# Patient Record
Sex: FEMALE | Race: WHITE | Employment: FULL TIME | URBAN - NONMETROPOLITAN AREA
[De-identification: names, ages, dates, MRNs, and addresses within clinical notes are randomized per-mention and may not be internally consistent; named-entity substitution may affect disease eponyms.]

---

## 2017-06-15 ENCOUNTER — OFFICE VISIT (OUTPATIENT)
Dept: PRIMARY CARE CLINIC | Age: 42
End: 2017-06-15
Payer: COMMERCIAL

## 2017-06-15 VITALS
HEART RATE: 74 BPM | DIASTOLIC BLOOD PRESSURE: 72 MMHG | HEIGHT: 63 IN | OXYGEN SATURATION: 98 % | BODY MASS INDEX: 24.98 KG/M2 | WEIGHT: 141 LBS | SYSTOLIC BLOOD PRESSURE: 112 MMHG | TEMPERATURE: 98.3 F

## 2017-06-15 DIAGNOSIS — L24.7 IRRITANT CONTACT DERMATITIS DUE TO PLANTS, EXCEPT FOOD: Primary | ICD-10-CM

## 2017-06-15 PROCEDURE — 99213 OFFICE O/P EST LOW 20 MIN: CPT | Performed by: FAMILY MEDICINE

## 2017-06-15 RX ORDER — TRIAMCINOLONE ACETONIDE 1 MG/G
CREAM TOPICAL 3 TIMES DAILY
Qty: 80 G | Refills: 1 | Status: SHIPPED | OUTPATIENT
Start: 2017-06-15 | End: 2021-11-10

## 2017-06-15 ASSESSMENT — ENCOUNTER SYMPTOMS
EYES NEGATIVE: 1
ALLERGIC/IMMUNOLOGIC NEGATIVE: 1
RESPIRATORY NEGATIVE: 1
GASTROINTESTINAL NEGATIVE: 1

## 2019-11-29 ENCOUNTER — HOSPITAL ENCOUNTER (EMERGENCY)
Age: 44
Discharge: HOME | End: 2019-11-29
Attending: EMERGENCY MEDICINE
Payer: COMMERCIAL

## 2019-11-29 VITALS
OXYGEN SATURATION: 97 % | HEART RATE: 93 BPM | RESPIRATION RATE: 16 BRPM | BODY MASS INDEX: 24.45 KG/M2 | WEIGHT: 138 LBS | TEMPERATURE: 99.7 F | DIASTOLIC BLOOD PRESSURE: 54 MMHG | SYSTOLIC BLOOD PRESSURE: 94 MMHG

## 2019-11-29 DIAGNOSIS — R11.2 NAUSEA VOMITING AND DIARRHEA: Primary | ICD-10-CM

## 2019-11-29 DIAGNOSIS — N39.0 URINARY TRACT INFECTION WITHOUT HEMATURIA, SITE UNSPECIFIED: ICD-10-CM

## 2019-11-29 DIAGNOSIS — R19.7 NAUSEA VOMITING AND DIARRHEA: Primary | ICD-10-CM

## 2019-11-29 DIAGNOSIS — E87.6 HYPOKALEMIA: ICD-10-CM

## 2019-11-29 DIAGNOSIS — E86.0 DEHYDRATION: ICD-10-CM

## 2019-11-29 LAB
-: ABNORMAL
ABSOLUTE EOS #: 0 K/UL (ref 0–0.4)
ABSOLUTE IMMATURE GRANULOCYTE: ABNORMAL K/UL (ref 0–0.3)
ABSOLUTE LYMPH #: 0.32 K/UL (ref 1–4.8)
ABSOLUTE MONO #: 0.24 K/UL (ref 0.1–1.2)
AMORPHOUS: ABNORMAL
ANION GAP SERPL CALCULATED.3IONS-SCNC: 14 MMOL/L (ref 9–17)
BACTERIA: ABNORMAL
BASOPHILS # BLD: 1 % (ref 0–1)
BASOPHILS ABSOLUTE: 0.08 K/UL (ref 0–0.2)
BILIRUBIN URINE: NEGATIVE
BUN BLDV-MCNC: 13 MG/DL (ref 6–20)
BUN/CREAT BLD: 24 (ref 9–20)
CALCIUM SERPL-MCNC: 9.1 MG/DL (ref 8.6–10.4)
CASTS UA: ABNORMAL /LPF (ref 0–2)
CHLORIDE BLD-SCNC: 102 MMOL/L (ref 98–107)
CO2: 22 MMOL/L (ref 20–31)
COLOR: ABNORMAL
COMMENT UA: ABNORMAL
CREAT SERPL-MCNC: 0.54 MG/DL (ref 0.5–0.9)
CRYSTALS, UA: ABNORMAL /HPF
DIFFERENTIAL TYPE: ABNORMAL
EOSINOPHILS RELATIVE PERCENT: 0 % (ref 1–7)
EPITHELIAL CELLS UA: ABNORMAL /HPF (ref 0–5)
GFR AFRICAN AMERICAN: >60 ML/MIN
GFR NON-AFRICAN AMERICAN: >60 ML/MIN
GFR SERPL CREATININE-BSD FRML MDRD: ABNORMAL ML/MIN/{1.73_M2}
GFR SERPL CREATININE-BSD FRML MDRD: ABNORMAL ML/MIN/{1.73_M2}
GLUCOSE BLD-MCNC: 119 MG/DL (ref 70–99)
GLUCOSE URINE: NEGATIVE
HCT VFR BLD CALC: 41.7 % (ref 36–46)
HEMOGLOBIN: 14 G/DL (ref 12–16)
IMMATURE GRANULOCYTES: ABNORMAL %
KETONES, URINE: ABNORMAL
LEUKOCYTE ESTERASE, URINE: ABNORMAL
LYMPHOCYTES # BLD: 4 % (ref 16–46)
MCH RBC QN AUTO: 30.3 PG (ref 26–34)
MCHC RBC AUTO-ENTMCNC: 33.6 G/DL (ref 31–37)
MCV RBC AUTO: 90 FL (ref 80–100)
MONOCYTES # BLD: 3 % (ref 4–11)
MORPHOLOGY: ABNORMAL
MORPHOLOGY: ABNORMAL
MUCUS: ABNORMAL
NITRITE, URINE: NEGATIVE
NRBC AUTOMATED: ABNORMAL PER 100 WBC
OTHER OBSERVATIONS UA: ABNORMAL
PDW BLD-RTO: 13.9 % (ref 11–14.5)
PH UA: 5.5 (ref 5–6)
PLATELET # BLD: 205 K/UL (ref 140–450)
PLATELET ESTIMATE: ABNORMAL
PMV BLD AUTO: 7.7 FL (ref 6–12)
POTASSIUM SERPL-SCNC: 3.6 MMOL/L (ref 3.7–5.3)
PROTEIN UA: NEGATIVE
RBC # BLD: 4.64 M/UL (ref 4–5.2)
RBC # BLD: ABNORMAL 10*6/UL
RBC UA: ABNORMAL /HPF (ref 0–4)
RENAL EPITHELIAL, UA: ABNORMAL /HPF
SEG NEUTROPHILS: 92 % (ref 43–77)
SEGMENTED NEUTROPHILS ABSOLUTE COUNT: 7.46 K/UL (ref 1.8–7.7)
SODIUM BLD-SCNC: 138 MMOL/L (ref 135–144)
SPECIFIC GRAVITY UA: 1.03 (ref 1.01–1.02)
TRICHOMONAS: ABNORMAL
TURBIDITY: ABNORMAL
URINE HGB: ABNORMAL
UROBILINOGEN, URINE: NORMAL
WBC # BLD: 8.1 K/UL (ref 3.5–11)
WBC # BLD: ABNORMAL 10*3/UL
WBC UA: ABNORMAL /HPF (ref 0–4)
YEAST: ABNORMAL

## 2019-11-29 PROCEDURE — 85025 COMPLETE CBC W/AUTO DIFF WBC: CPT

## 2019-11-29 PROCEDURE — 81001 URINALYSIS AUTO W/SCOPE: CPT

## 2019-11-29 PROCEDURE — 6360000002 HC RX W HCPCS: Performed by: EMERGENCY MEDICINE

## 2019-11-29 PROCEDURE — 80048 BASIC METABOLIC PNL TOTAL CA: CPT

## 2019-11-29 PROCEDURE — 96374 THER/PROPH/DIAG INJ IV PUSH: CPT

## 2019-11-29 PROCEDURE — 96361 HYDRATE IV INFUSION ADD-ON: CPT

## 2019-11-29 PROCEDURE — 2580000003 HC RX 258: Performed by: EMERGENCY MEDICINE

## 2019-11-29 PROCEDURE — 6370000000 HC RX 637 (ALT 250 FOR IP): Performed by: SPECIALIST

## 2019-11-29 PROCEDURE — 99284 EMERGENCY DEPT VISIT MOD MDM: CPT

## 2019-11-29 RX ORDER — 0.9 % SODIUM CHLORIDE 0.9 %
1000 INTRAVENOUS SOLUTION INTRAVENOUS ONCE
Status: COMPLETED | OUTPATIENT
Start: 2019-11-29 | End: 2019-11-29

## 2019-11-29 RX ORDER — SULFAMETHOXAZOLE AND TRIMETHOPRIM 800; 160 MG/1; MG/1
1 TABLET ORAL 2 TIMES DAILY
Qty: 14 TABLET | Refills: 0 | Status: SHIPPED | OUTPATIENT
Start: 2019-11-29 | End: 2019-12-06

## 2019-11-29 RX ORDER — ONDANSETRON 2 MG/ML
4 INJECTION INTRAMUSCULAR; INTRAVENOUS ONCE
Status: COMPLETED | OUTPATIENT
Start: 2019-11-29 | End: 2019-11-29

## 2019-11-29 RX ORDER — ONDANSETRON 4 MG/1
4 TABLET, ORALLY DISINTEGRATING ORAL EVERY 8 HOURS PRN
Qty: 12 TABLET | Refills: 0 | Status: ON HOLD | OUTPATIENT
Start: 2019-11-29 | End: 2021-11-11 | Stop reason: HOSPADM

## 2019-11-29 RX ORDER — SULFAMETHOXAZOLE AND TRIMETHOPRIM 800; 160 MG/1; MG/1
1 TABLET ORAL ONCE
Status: COMPLETED | OUTPATIENT
Start: 2019-11-29 | End: 2019-11-29

## 2019-11-29 RX ORDER — POTASSIUM CHLORIDE 20 MEQ/1
20 TABLET, EXTENDED RELEASE ORAL ONCE
Status: COMPLETED | OUTPATIENT
Start: 2019-11-29 | End: 2019-11-29

## 2019-11-29 RX ADMIN — SULFAMETHOXAZOLE AND TRIMETHOPRIM 1 TABLET: 800; 160 TABLET ORAL at 20:07

## 2019-11-29 RX ADMIN — SODIUM CHLORIDE 1000 ML: 9 INJECTION, SOLUTION INTRAVENOUS at 19:24

## 2019-11-29 RX ADMIN — POTASSIUM CHLORIDE 20 MEQ: 20 TABLET, EXTENDED RELEASE ORAL at 20:07

## 2019-11-29 RX ADMIN — ONDANSETRON 4 MG: 2 INJECTION INTRAMUSCULAR; INTRAVENOUS at 19:24

## 2019-11-29 ASSESSMENT — PAIN SCALES - GENERAL: PAINLEVEL_OUTOF10: 6

## 2019-11-29 ASSESSMENT — PAIN DESCRIPTION - LOCATION: LOCATION: ABDOMEN

## 2021-02-22 ENCOUNTER — HOSPITAL ENCOUNTER (OUTPATIENT)
Dept: GENERAL RADIOLOGY | Age: 46
Discharge: HOME | End: 2021-02-24
Payer: COMMERCIAL

## 2021-02-22 ENCOUNTER — OFFICE VISIT (OUTPATIENT)
Dept: PRIMARY CARE CLINIC | Age: 46
End: 2021-02-22
Payer: COMMERCIAL

## 2021-02-22 VITALS
SYSTOLIC BLOOD PRESSURE: 120 MMHG | HEART RATE: 88 BPM | WEIGHT: 167 LBS | BODY MASS INDEX: 30.73 KG/M2 | TEMPERATURE: 97.9 F | HEIGHT: 62 IN | DIASTOLIC BLOOD PRESSURE: 88 MMHG | OXYGEN SATURATION: 99 %

## 2021-02-22 DIAGNOSIS — R10.9 LEFT LATERAL ABDOMINAL PAIN: ICD-10-CM

## 2021-02-22 DIAGNOSIS — R10.9 LEFT LATERAL ABDOMINAL PAIN: Primary | ICD-10-CM

## 2021-02-22 PROCEDURE — 74018 RADEX ABDOMEN 1 VIEW: CPT

## 2021-02-22 PROCEDURE — 99214 OFFICE O/P EST MOD 30 MIN: CPT | Performed by: FAMILY MEDICINE

## 2021-02-22 ASSESSMENT — ENCOUNTER SYMPTOMS
VOMITING: 0
RESPIRATORY NEGATIVE: 1
CONSTIPATION: 0
EYES NEGATIVE: 1
ABDOMINAL PAIN: 1
NAUSEA: 1
ALLERGIC/IMMUNOLOGIC NEGATIVE: 1
BACK PAIN: 1
DIARRHEA: 0

## 2021-02-22 NOTE — PROGRESS NOTES
Subjective:      Patient ID: Vel Barron is a 54 y.o. female. HPI Acute urgent care visit for chronic discomfort in the left side of the abdomen in the last 2 months. Colicy, off and on by report. Eating too much can make it worse. Mild nausea recalled for a couple day then resolved. Lower left back pain , new, starting Saturday. More constant. No pain to palpation over the lower back. Hurst more stretching the back or bending over, described as an ache. No new medications or changes in existing medications. Some wt. Gain, no changes in appetite. No fever or chills. No perceived bowel issues, no constipation. Some urinary urgency described. Past Medical History:   Diagnosis Date    Goiter     Hypothyroidism      Past Surgical History:   Procedure Laterality Date     SECTION  2005    COLONOSCOPY  2007    with distal ileoscopy and biopsy of the distal ileum and biopsy of the rectum; nonspecific proctitis, otherwise normal colon and normal distal ileum     Current Outpatient Medications   Medication Sig Dispense Refill    ondansetron (ZOFRAN ODT) 4 MG disintegrating tablet Take 1 tablet by mouth every 8 hours as needed for Nausea (Patient not taking: Reported on 2021) 12 tablet 0    triamcinolone (KENALOG) 0.1 % cream Apply topically 3 times daily (Patient not taking: Reported on 2021) 80 g 1    levothyroxine (SYNTHROID) 112 MCG tablet Take 1 tablet by mouth Daily. (Patient not taking: Reported on 2021) 30 tablet 2     No current facility-administered medications for this visit. Allergies   Allergen Reactions    Codeine     Other      decongestants    Penicillins          Review of Systems   Constitutional: Negative. Negative for appetite change, fever and unexpected weight change. HENT: Negative. Eyes: Negative. Respiratory: Negative. Cardiovascular: Negative. Gastrointestinal: Positive for abdominal pain (left side) and nausea. Negative for constipation, diarrhea and vomiting. Endocrine: Negative. Genitourinary: Positive for urgency. Negative for dysuria, frequency and menstrual problem (regular, described as heavy). Musculoskeletal: Positive for back pain (lower back, left of midline. ). Skin: Negative. Allergic/Immunologic: Negative. Neurological: Negative. Hematological: Negative. Psychiatric/Behavioral: Negative. Objective:   Physical Exam  Constitutional:       General: She is not in acute distress. Appearance: She is well-developed. HENT:      Head: Normocephalic and atraumatic. Right Ear: External ear normal.      Left Ear: External ear normal.      Mouth/Throat:      Pharynx: No oropharyngeal exudate. Eyes:      General: No scleral icterus. Conjunctiva/sclera: Conjunctivae normal.   Neck:      Musculoskeletal: Neck supple. Thyroid: No thyromegaly. Cardiovascular:      Rate and Rhythm: Normal rate and regular rhythm. Heart sounds: Normal heart sounds. No murmur. Pulmonary:      Effort: Pulmonary effort is normal. No respiratory distress. Breath sounds: Normal breath sounds. No wheezing. Abdominal:      General: Bowel sounds are normal. There is no distension. Palpations: Abdomen is soft. Tenderness: There is no abdominal tenderness. There is no rebound. Musculoskeletal: Normal range of motion. General: No tenderness. Skin:     General: Skin is warm and dry. Findings: No erythema or rash. Neurological:      Mental Status: She is alert and oriented to person, place, and time. Psychiatric:         Behavior: Behavior normal.         Thought Content:  Thought content normal.         Judgment: Judgment normal. /88 (Site: Left Upper Arm, Position: Sitting, Cuff Size: Medium Adult)   Pulse 88   Temp 97.9 °F (36.6 °C) (Temporal)   Ht 5' 2\" (1.575 m)   Wt 167 lb (75.8 kg)   SpO2 99%   BMI 30.54 kg/m²     Assessment:      Encounter Diagnosis   Name Primary?  Left lateral abdominal pain Yes           Plan:      Some burden of stool in the right upper quadrant. Gas bubble in left abdomen, likely colon. Discussed miralax, fiber, MOM trials in the next few days to rule out suspected constipation issues. Discussed developing a daily regimen against constipation. She is to establish here as a patient for follow up. Return for worsening, persistent , or new symptoms of concern.                Jeanna Paiz MD

## 2021-05-05 ENCOUNTER — TELEPHONE (OUTPATIENT)
Dept: SURGERY | Age: 46
End: 2021-05-05

## 2021-05-05 NOTE — TELEPHONE ENCOUNTER
Patient due for screening colonoscopy, no symptoms. Referral from Dr. Marcos Jiménez. She is requesting procedure at [facility]. Dr. Catalina Maria colonoscopy packet mailed.

## 2021-10-26 ENCOUNTER — HOSPITAL ENCOUNTER (OUTPATIENT)
Age: 46
Setting detail: SPECIMEN
Discharge: HOME | End: 2021-10-26
Payer: COMMERCIAL

## 2021-10-26 ENCOUNTER — OFFICE VISIT (OUTPATIENT)
Dept: PRIMARY CARE CLINIC | Age: 46
End: 2021-10-26
Payer: COMMERCIAL

## 2021-10-26 VITALS
RESPIRATION RATE: 20 BRPM | WEIGHT: 166.8 LBS | HEART RATE: 110 BPM | DIASTOLIC BLOOD PRESSURE: 78 MMHG | OXYGEN SATURATION: 98 % | TEMPERATURE: 98.5 F | BODY MASS INDEX: 30.51 KG/M2 | SYSTOLIC BLOOD PRESSURE: 112 MMHG

## 2021-10-26 DIAGNOSIS — R52 BODY ACHES: ICD-10-CM

## 2021-10-26 DIAGNOSIS — J02.9 PHARYNGITIS, UNSPECIFIED ETIOLOGY: Primary | ICD-10-CM

## 2021-10-26 DIAGNOSIS — J02.9 PHARYNGITIS, UNSPECIFIED ETIOLOGY: ICD-10-CM

## 2021-10-26 DIAGNOSIS — Z20.822 CLOSE EXPOSURE TO COVID-19 VIRUS: ICD-10-CM

## 2021-10-26 DIAGNOSIS — Z20.822 PERSON UNDER INVESTIGATION FOR COVID-19: ICD-10-CM

## 2021-10-26 PROCEDURE — U0005 INFEC AGEN DETEC AMPLI PROBE: HCPCS

## 2021-10-26 PROCEDURE — U0003 INFECTIOUS AGENT DETECTION BY NUCLEIC ACID (DNA OR RNA); SEVERE ACUTE RESPIRATORY SYNDROME CORONAVIRUS 2 (SARS-COV-2) (CORONAVIRUS DISEASE [COVID-19]), AMPLIFIED PROBE TECHNIQUE, MAKING USE OF HIGH THROUGHPUT TECHNOLOGIES AS DESCRIBED BY CMS-2020-01-R: HCPCS

## 2021-10-26 PROCEDURE — 99213 OFFICE O/P EST LOW 20 MIN: CPT | Performed by: NURSE PRACTITIONER

## 2021-10-26 ASSESSMENT — ENCOUNTER SYMPTOMS
VOMITING: 0
SORE THROAT: 1
NAUSEA: 0
RHINORRHEA: 0
COUGH: 0
ABDOMINAL PAIN: 0
RESPIRATORY NEGATIVE: 1

## 2021-10-26 ASSESSMENT — PATIENT HEALTH QUESTIONNAIRE - PHQ9
SUM OF ALL RESPONSES TO PHQ QUESTIONS 1-9: 0
1. LITTLE INTEREST OR PLEASURE IN DOING THINGS: 0
SUM OF ALL RESPONSES TO PHQ9 QUESTIONS 1 & 2: 0
2. FEELING DOWN, DEPRESSED OR HOPELESS: 0

## 2021-10-26 NOTE — PROGRESS NOTES
[facility]             [address], [address]                        Telephone [phone]             Fax [phone]     Satnam Arambula  1975  ROS:[id number]   Date of visit:  10/26/2021    Subjective:    Satnam Arambula is a 54 y.o.  female who presents to [facility] today (10/26/2021) for evaluation of:    Chief Complaint   Patient presents with    Pharyngitis      was covid positive        Pharyngitis  This is a new problem. The current episode started in the past 7 days (10/24/21). The problem occurs constantly. The problem has been unchanged. Associated symptoms include myalgias (now resolved) and a sore throat (scratchy). Pertinent negatives include no abdominal pain, chest pain, chills, congestion, coughing, fatigue, fever, headaches, nausea, rash or vomiting. Nothing aggravates the symptoms. Treatments tried: ibuprofen, zinc, vitamin C. The treatment provided moderate relief. She has not received Covid-19 vaccine. She has the following problem list:  There is no problem list on file for this patient. Current medications are:  Current Outpatient Medications   Medication Sig Dispense Refill    Thyroid 48.75 MG TABS Take 48.75 mg by mouth daily      ondansetron (ZOFRAN ODT) 4 MG disintegrating tablet Take 1 tablet by mouth every 8 hours as needed for Nausea (Patient not taking: Reported on 2/22/2021) 12 tablet 0    triamcinolone (KENALOG) 0.1 % cream Apply topically 3 times daily (Patient not taking: Reported on 2/22/2021) 80 g 1    levothyroxine (SYNTHROID) 112 MCG tablet Take 1 tablet by mouth Daily. (Patient not taking: Reported on 2/22/2021) 30 tablet 2     No current facility-administered medications for this visit. She is allergic to codeine, other, and penicillins. .    She  reports that she has quit smoking.  She has never used smokeless tobacco.      Objective:    Vitals:    10/26/21 1537   BP: 112/78   Pulse: 110   Resp: 20   Temp: 98.5 °F (36.9 °C)   SpO2: 98%   Weight: 166 lb 12.8 oz (75.7 kg)     Body mass index is 30.51 kg/m². Review of Systems   Constitutional: Negative. Negative for appetite change, chills, fatigue and fever. HENT: Positive for sore throat (scratchy). Negative for congestion, postnasal drip and rhinorrhea. Respiratory: Negative. Negative for cough. Cardiovascular: Negative. Negative for chest pain. Gastrointestinal: Negative for abdominal pain, nausea and vomiting. Musculoskeletal: Positive for myalgias (now resolved). Skin: Negative for rash. Neurological: Negative for headaches. Physical Exam  Vitals and nursing note reviewed. Constitutional:       Appearance: She is well-developed. HENT:      Head: Normocephalic. Jaw: There is normal jaw occlusion. Right Ear: Tympanic membrane, ear canal and external ear normal.      Left Ear: Tympanic membrane, ear canal and external ear normal.      Nose: Nose normal.      Mouth/Throat:      Lips: Pink. Mouth: Mucous membranes are moist.      Pharynx: Uvula midline. Posterior oropharyngeal erythema present. Tonsils: 1+ on the right. 1+ on the left. Eyes:      Pupils: Pupils are equal, round, and reactive to light. Cardiovascular:      Rate and Rhythm: Normal rate and regular rhythm. Heart sounds: Normal heart sounds. Pulmonary:      Effort: Pulmonary effort is normal.      Breath sounds: Normal breath sounds and air entry. Musculoskeletal:      Cervical back: Normal range of motion and neck supple. Lymphadenopathy:      Cervical: No cervical adenopathy. Skin:     General: Skin is warm and dry. Neurological:      General: No focal deficit present. Mental Status: She is alert and oriented to person, place, and time. Psychiatric:         Behavior: Behavior normal.         Thought Content:  Thought content normal. Assessment and Plan:    No results found for this visit on 10/26/21. Diagnosis Orders   1. Pharyngitis, unspecified etiology  COVID-19   2. Body aches  COVID-19   3. Close exposure to COVID-19 virus  COVID-19   4. Person under investigation for COVID-19  COVID-19     Self quarantine until negative Covid-19 test result received and symptoms improving. We will call with Covid-19 test results. We discussed Regeneron infusion and printed information was provided. I recommended alternating tylenol and ibuprofen for pain, increase fluid intake, and eating popsicles and jello for comfort. Warm salt water gargles. Use Chloraseptic spray as needed for sore throat. Follow up with PCP if symptoms not improved or worsen. The use, risks, benefits, and side effects of prescribed or recommended medications were discussed. All questions were answered and the patient/caregiver voiced understanding. No orders of the defined types were placed in this encounter.         Electronically signed by JAMES Ovalles CNP on 10/26/21 at 3:47 PM EDT

## 2021-10-26 NOTE — PATIENT INSTRUCTIONS
Patient Education        Learning About Coronavirus [phone])  What is coronavirus (COVID-19)? COVID-19 is a disease caused by a type of coronavirus. This illness was first found in December 2019. It has since spread worldwide. Coronaviruses are a large group of viruses. They cause the common cold. They also cause more serious illnesses like [place] respiratory syndrome (MERS) and severe acute respiratory syndrome (SARS). COVID-19 is caused by a novel coronavirus. That means it's a new type that has not been seen in people before. What are the symptoms? COVID-19 symptoms may include:  · Fever. · Cough. · Trouble breathing. · Chills or repeated shaking with chills. · Muscle and body aches. · Headache. · Sore throat. · New loss of taste or smell. · Vomiting. · Diarrhea. In severe cases, COVID-19 can cause pneumonia and make it hard to breathe without help from a machine. It can cause death. How is it diagnosed? COVID-19 is diagnosed with a viral test. This may also be called a PCR test or antigen test. It looks for evidence of the virus in your breathing passages or lungs (respiratory system). The test is most often done on a sample from the nose, throat, or lungs. It's sometimes done on a sample of saliva. One way a sample is collected is by putting a long swab into the back of your nose. How is it treated? Mild cases of COVID-19 can be treated at home. Serious cases need treatment in the hospital. Treatment may include medicines to reduce symptoms, plus breathing support such as oxygen therapy or a ventilator. Some people may be placed on their belly to help their oxygen levels. Treatments that may help people who have COVID-19 include:  Antiviral medicines. These medicines treat viral infections. Remdesivir is an example. Immune-based therapy. These medicines help the immune system fight COVID-19. Examples include monoclonal antibodies. Blood thinners.    These medicines help prevent blood clots. People with severe illness are at risk for blood clots. How can you protect yourself and others? The best way to protect yourself from getting sick is to:  · Get vaccinated. · Avoid sick people. · If you are not fully vaccinated:  ? Wear a mask if you have to go to public areas. ? Avoid crowds and try to stay at least 6 feet away from other people. · Cover your mouth with a tissue when you cough or sneeze. · Wash your hands often, especially after you cough or sneeze. Use soap and water, and scrub for at least 20 seconds. If soap and water aren't available, use an alcohol-based hand . · Avoid touching your mouth, nose, and eyes. To help avoid spreading the virus to others:  · Get vaccinated. · Cover your mouth with a tissue when you cough or sneeze. · Wash your hands often, especially after you cough or sneeze. Use soap and water, and scrub for at least 20 seconds. If soap and water aren't available, use an alcohol-based hand . · If you have been exposed to the virus and are not fully vaccinated:  ? Stay home. Don't go to school, work, or public areas. And don't use public transportation, ride-shares, or taxis unless you have no choice. ? Wear a mask if you have to go to public areas, like the pharmacy. · If you're sick:  ? Leave your home only if you need to get medical care. But call the doctor's office first so they know you're coming. And wear a mask. ? Wear a mask whenever you're around other people. ? Limit contact with pets and people in your home. If possible, stay in a separate bedroom and use a separate bathroom. ? Clean and disinfect your home every day. Use household  and disinfectant wipes or sprays. Take special care to clean things that you touch with your hands. How can you self-isolate when you have COVID-19? If you have COVID-19, there are things you can do to help avoid spreading the virus to others.   · Limit contact with people in your home. If possible, stay in a separate bedroom and use a separate bathroom. · Wear a mask when you are around other people. · If you have to leave home, avoid crowds and try to stay at least 6 feet away from other people. · Avoid contact with pets and other animals. · Cover your mouth and nose with a tissue when you cough or sneeze. Then throw it in the trash right away. · Wash your hands often, especially after you cough or sneeze. Use soap and water, and scrub for at least 20 seconds. If soap and water aren't available, use an alcohol-based hand . · Don't share personal household items. These include bedding, towels, cups and glasses, and eating utensils. · [address] in the warmest water allowed for the fabric type, and dry it completely. It's okay to wash other people's laundry with yours. · Clean and disinfect your home. Use household  and disinfectant wipes or sprays. When should you call for help? Call 911 anytime you think you may need emergency care. For example, call if you have life-threatening symptoms, such as:    · You have severe trouble breathing. (You can't talk at all.)     · You have constant chest pain or pressure.     · You are severely dizzy or lightheaded.     · You are confused or can't think clearly.     · You have pale, gray, or blue-colored skin or lips.     · You pass out (lose consciousness) or are very hard to wake up. Call your doctor now or seek immediate medical care if:    · You have moderate trouble breathing. (You can't speak a full sentence.)     · You are coughing up blood (more than about 1 teaspoon).     · You have signs of low blood pressure. These include feeling lightheaded; being too weak to stand; and having cold, pale, clammy skin.    Watch closely for changes in your health, and be sure to contact your doctor if:    · Your symptoms get worse.     · You are not getting better as expected.     · You have new or worse symptoms of anxiety, depression, nightmares, or flashbacks. Call before you go to the doctor's office. Follow their instructions. And wear a mask. Current as of: July 1, 2021               Content Version: 13.0  © 2006-2021 [employer], Incorporated. Care instructions adapted under license by [facility] ([facility]). If you have questions about a medical condition or this instruction, always ask your healthcare professional. [place] [address] any warranty or liability for your use of this information. Patient Education        Sore Throat: Care Instructions  Your Care Instructions     Infection by bacteria or a virus causes most sore throats. Cigarette smoke, dry air, air pollution, allergies, and yelling can also cause a sore throat. Sore throats can be painful and annoying. Fortunately, most sore throats go away on their own. If you have a bacterial infection, your doctor may prescribe antibiotics. Follow-up care is a key part of your treatment and safety. Be sure to make and go to all appointments, and call your doctor if you are having problems. It's also a good idea to know your test results and keep a list of the medicines you take. How can you care for yourself at home? · If your doctor prescribed antibiotics, take them as directed. Do not stop taking them just because you feel better. You need to take the full course of antibiotics. · Gargle with warm salt water once an hour to help reduce swelling and relieve discomfort. Use 1 teaspoon of salt mixed in 1 cup of warm water. · Take an over-the-counter pain medicine, such as acetaminophen (Tylenol), ibuprofen (Advil, Motrin), or naproxen (Aleve). Read and follow all instructions on the label. · Be careful when taking over-the-counter cold or flu medicines and Tylenol at the same time. Many of these medicines have acetaminophen, which is Tylenol. Read the labels to make sure that you are not taking more than the recommended dose.  Too much acetaminophen (Tylenol) can be harmful. · Drink plenty of fluids. Fluids may help soothe an irritated throat. Hot fluids, such as tea or soup, may help decrease throat pain. · Use over-the-counter throat lozenges to soothe pain. Regular cough drops or hard candy may also help. These should not be given to young children because of the risk of choking. · Do not smoke or allow others to smoke around you. If you need help quitting, talk to your doctor about stop-smoking programs and medicines. These can increase your chances of quitting for good. · Use a vaporizer or humidifier to add moisture to your bedroom. Follow the directions for cleaning the machine. When should you call for help? Call your doctor now or seek immediate medical care if:    · You have new or worse trouble swallowing.     · Your sore throat gets much worse on one side. Watch closely for changes in your health, and be sure to contact your doctor if you do not get better as expected. Where can you learn more? Go to https://StatsMix.[employer]. org and sign in to your [employer] account. Enter G401 in the [facility] box to learn more about \"Sore Throat: Care Instructions. \"     If you do not have an account, please click on the \"Sign Up Now\" link. Current as of: December 2, 2020               Content Version: 13.0  © 3929-7502 [employer]. Care instructions adapted under license by [facility] ([facility]). If you have questions about a medical condition or this instruction, always ask your healthcare professional. [place] [address] any warranty or liability for your use of this information.

## 2021-10-27 LAB
SARS-COV-2: ABNORMAL
SARS-COV-2: DETECTED
SOURCE: ABNORMAL

## 2021-11-01 ENCOUNTER — HOSPITAL ENCOUNTER (EMERGENCY)
Age: 46
Discharge: HOME | End: 2021-11-01
Attending: EMERGENCY MEDICINE
Payer: COMMERCIAL

## 2021-11-01 VITALS
RESPIRATION RATE: 18 BRPM | BODY MASS INDEX: 28.35 KG/M2 | DIASTOLIC BLOOD PRESSURE: 60 MMHG | TEMPERATURE: 100.1 F | OXYGEN SATURATION: 98 % | WEIGHT: 160 LBS | HEIGHT: 63 IN | SYSTOLIC BLOOD PRESSURE: 115 MMHG | HEART RATE: 108 BPM

## 2021-11-01 DIAGNOSIS — U07.1 COVID: Primary | ICD-10-CM

## 2021-11-01 LAB — GLUCOSE BLD-MCNC: 118 MG/DL (ref 65–105)

## 2021-11-01 PROCEDURE — 99284 EMERGENCY DEPT VISIT MOD MDM: CPT

## 2021-11-01 PROCEDURE — 6370000000 HC RX 637 (ALT 250 FOR IP): Performed by: EMERGENCY MEDICINE

## 2021-11-01 PROCEDURE — 82947 ASSAY GLUCOSE BLOOD QUANT: CPT

## 2021-11-01 RX ORDER — DIPHENHYDRAMINE HCL 25 MG
50 TABLET ORAL ONCE
Status: COMPLETED | OUTPATIENT
Start: 2021-11-01 | End: 2021-11-01

## 2021-11-01 RX ADMIN — DIPHENHYDRAMINE HYDROCHLORIDE 50 MG: 25 TABLET ORAL at 03:30

## 2021-11-01 NOTE — ED PROVIDER NOTES
eMERGENCY dEPARTMENT eNCOUnter      Pt Name: Juliocesar Priest  MRN: [id number]  [place] 1975  Date of evaluation: 2021      CHIEF COMPLAINT       Chief Complaint   Patient presents with    Positive For Covid-19     fatigue, not able to eat         HISTORY OF PRESENT ILLNESS    Juliocesar Priest is a 51 y.o. female who presents for fatigue decreased appetite. Patient states she was diagnosed with Covid over last couple days she states she has not had an appetite she has been feeling tired headache body aches no chest pain no significant shortness of breath mild fever patient states her anxiety has been acting up        REVIEW OF SYSTEMS       Review of systems are all reviewed and negative except stated above in HPI    [address]    has a past medical history of Goiter and Hypothyroidism. SURGICAL HISTORY      has a past surgical history that includes  section (2005) and Colonoscopy (2007). CURRENT MEDICATIONS       Discharge Medication List as of 2021  3:45 AM      CONTINUE these medications which have NOT CHANGED    Details   Thyroid 48.75 MG TABS Take 48.75 mg by mouth dailyHistorical Med      ondansetron (ZOFRAN ODT) 4 MG disintegrating tablet Take 1 tablet by mouth every 8 hours as needed for Nausea, Disp-12 tablet, R-0Print      triamcinolone (KENALOG) 0.1 % cream Apply topically 3 times daily, Topical, 3 TIMES DAILY Starting 6/15/2017, Until Discontinued, Disp-80 g, R-1, Normal      levothyroxine (SYNTHROID) 112 MCG tablet Take 1 tablet by mouth Daily. , Disp-30 tablet, R-2             ALLERGIES     is allergic to codeine, other, and penicillins. FAMILY HISTORY     She indicated that the status of her mother is unknown. She indicated that the status of her father is unknown.  She indicated that the status of her brother is unknown.     family history includes Colon Cancer in her mother; Emphysema in her father; High Blood Pressure in her brother and sister; Other in her mother, sister, and sister; Thyroid Disease in her mother. SOCIAL HISTORY      reports that she has quit smoking. She has never used smokeless tobacco. She reports that she does not drink alcohol and does not use drugs. PHYSICAL EXAM     INITIAL VITALS:  height is 5' 3\" (1.6 m) and weight is 160 lb (72.6 kg). Her tympanic temperature is 100.1 °F (37.8 °C). Her blood pressure is 115/60 and her pulse is 108. Her respiration is 18 and oxygen saturation is 98%.       General: Patient alert nontoxic-appearing female in no apparent distress  HEENT: Head is atraumatic  Respiratory: Lung sounds are clear bilateral  Cardiac: Heart is mildly tachycardic without murmur  GI: Abdomen soft nontender  Skin: Warm dry no rash  Neuro: Patient has no gross focal neurological deficits at bedside exam    DIFFERENTIAL DIAGNOSIS/ MDM:     Covid    DIAGNOSTIC RESULTS     EKG: All EKG's are interpreted by the Emergency Department Physician who either signs or Co-signs this chart in the absence of a cardiologist.        RADIOLOGY:   I directly visualized the following  images and reviewed the radiologist interpretations:  No orders to display         LABS:  Labs Reviewed   POC GLUCOSE FINGERSTICK - Abnormal; Notable for the following components:       Result Value    POC Glucose 118 (*)     All other components within normal limits   POCT GLUCOSE         EMERGENCY DEPARTMENT COURSE:   Vitals:    Vitals:    11/01/21 0256 11/01/21 0345   BP: (!) 142/86 115/60   Pulse: 125 108   Resp: 18 18   Temp: 100.1 °F (37.8 °C)    TempSrc: Tympanic    SpO2: 99% 98%   Weight: 160 lb (72.6 kg)    Height: 5' 3\" (1.6 m)      -------------------------  BP: 115/60, Temp: 100.1 °F (37.8 °C), Pulse: 108, Resp: 18    Orders Placed This Encounter   Medications    diphenhydrAMINE (BENADRYL) tablet 50 mg           Re-evaluation Notes    Patient has no complaints of shortness of breath or chest pain other than some mild tachycardia vital signs are fine at this point I feel her symptoms are consistent with her Covid disease she was instructed symptomatic treatment follow-up as directed return if worse    CRITICAL CARE:   None      CONSULTS:      PROCEDURES:  None    FINAL IMPRESSION      1. COVID          DISPOSITION/PLAN   DISPOSITION Decision To Discharge 11/01/2021 03:23:40 AM      Condition on Disposition    Stable    PATIENT REFERRED TO:  No follow-up provider specified. DISCHARGE MEDICATIONS:  Discharge Medication List as of 11/1/2021  3:45 AM          (Please note that portions of this note were completed with a voice recognition program.  Efforts were made to edit the dictations but occasionally words are mis-transcribed.)    Dami Hill MD,, MD, F.A.C.E.P.   Attending Emergency Physician        Dami Hill MD  11/01/21 6014

## 2021-11-02 ENCOUNTER — TELEPHONE (OUTPATIENT)
Dept: PRIMARY CARE CLINIC | Age: 46
End: 2021-11-02

## 2021-11-02 ENCOUNTER — CARE COORDINATION (OUTPATIENT)
Dept: CARE COORDINATION | Age: 46
End: 2021-11-02

## 2021-11-02 DIAGNOSIS — U07.1 COVID-19: ICD-10-CM

## 2021-11-02 RX ORDER — EPINEPHRINE 1 MG/ML
0.3 INJECTION, SOLUTION, CONCENTRATE INTRAVENOUS PRN
OUTPATIENT
Start: 2021-11-02

## 2021-11-02 RX ORDER — HEPARIN SODIUM (PORCINE) LOCK FLUSH IV SOLN 100 UNIT/ML 100 UNIT/ML
500 SOLUTION INTRAVENOUS PRN
OUTPATIENT
Start: 2021-11-02

## 2021-11-02 RX ORDER — SODIUM CHLORIDE 9 MG/ML
25 INJECTION, SOLUTION INTRAVENOUS PRN
OUTPATIENT
Start: 2021-11-02

## 2021-11-02 RX ORDER — DIPHENHYDRAMINE HYDROCHLORIDE 50 MG/ML
50 INJECTION INTRAMUSCULAR; INTRAVENOUS ONCE
OUTPATIENT
Start: 2021-11-02 | End: 2021-11-02

## 2021-11-02 RX ORDER — SODIUM CHLORIDE 0.9 % (FLUSH) 0.9 %
5-40 SYRINGE (ML) INJECTION PRN
OUTPATIENT
Start: 2021-11-02

## 2021-11-02 RX ORDER — SODIUM CHLORIDE 9 MG/ML
INJECTION, SOLUTION INTRAVENOUS CONTINUOUS
OUTPATIENT
Start: 2021-11-02

## 2021-11-02 RX ORDER — METHYLPREDNISOLONE SODIUM SUCCINATE 125 MG/2ML
125 INJECTION, POWDER, LYOPHILIZED, FOR SOLUTION INTRAMUSCULAR; INTRAVENOUS ONCE
OUTPATIENT
Start: 2021-11-02 | End: 2021-11-02

## 2021-11-02 NOTE — CARE COORDINATION
Patient was called to follow up with most recent ER visit. There was no answer. A message was left on voicemail to have patient call back regarding ER visit. Office number given [phone].

## 2021-11-02 NOTE — TELEPHONE ENCOUNTER
Patient called in and was requesting to have Regeneron. Patient states her symptoms started 10/25. Please place order.

## 2021-11-03 ENCOUNTER — HOSPITAL ENCOUNTER (OUTPATIENT)
Dept: INFUSION THERAPY | Age: 46
Setting detail: INFUSION SERIES
Discharge: HOME | End: 2021-11-03
Payer: COMMERCIAL

## 2021-11-03 VITALS
OXYGEN SATURATION: 99 % | SYSTOLIC BLOOD PRESSURE: 117 MMHG | HEART RATE: 104 BPM | DIASTOLIC BLOOD PRESSURE: 81 MMHG | RESPIRATION RATE: 16 BRPM

## 2021-11-03 DIAGNOSIS — U07.1 COVID-19: Primary | ICD-10-CM

## 2021-11-03 PROCEDURE — 2500000003 HC RX 250 WO HCPCS: Performed by: NURSE PRACTITIONER

## 2021-11-03 PROCEDURE — 2580000003 HC RX 258: Performed by: NURSE PRACTITIONER

## 2021-11-03 RX ORDER — SODIUM CHLORIDE 9 MG/ML
INJECTION, SOLUTION INTRAVENOUS CONTINUOUS
OUTPATIENT
Start: 2021-11-03

## 2021-11-03 RX ORDER — HEPARIN SODIUM (PORCINE) LOCK FLUSH IV SOLN 100 UNIT/ML 100 UNIT/ML
500 SOLUTION INTRAVENOUS PRN
OUTPATIENT
Start: 2021-11-03

## 2021-11-03 RX ORDER — SODIUM CHLORIDE 9 MG/ML
25 INJECTION, SOLUTION INTRAVENOUS PRN
OUTPATIENT
Start: 2021-11-03

## 2021-11-03 RX ORDER — DIPHENHYDRAMINE HYDROCHLORIDE 50 MG/ML
50 INJECTION INTRAMUSCULAR; INTRAVENOUS ONCE
OUTPATIENT
Start: 2021-11-03 | End: 2021-11-03

## 2021-11-03 RX ORDER — SODIUM CHLORIDE 0.9 % (FLUSH) 0.9 %
5-40 SYRINGE (ML) INJECTION PRN
OUTPATIENT
Start: 2021-11-03

## 2021-11-03 RX ORDER — EPINEPHRINE 1 MG/ML
0.3 INJECTION, SOLUTION, CONCENTRATE INTRAVENOUS PRN
OUTPATIENT
Start: 2021-11-03

## 2021-11-03 RX ORDER — METHYLPREDNISOLONE SODIUM SUCCINATE 125 MG/2ML
125 INJECTION, POWDER, LYOPHILIZED, FOR SOLUTION INTRAMUSCULAR; INTRAVENOUS ONCE
OUTPATIENT
Start: 2021-11-03 | End: 2021-11-03

## 2021-11-03 RX ADMIN — CASIRIVIMAB: 1332 INJECTION, SOLUTION, CONCENTRATE INTRAVENOUS at 10:22

## 2021-11-10 ENCOUNTER — OFFICE VISIT (OUTPATIENT)
Dept: PRIMARY CARE CLINIC | Age: 46
End: 2021-11-10
Payer: COMMERCIAL

## 2021-11-10 ENCOUNTER — HOSPITAL ENCOUNTER (INPATIENT)
Age: 46
LOS: 1 days | Discharge: HOME | DRG: 176 | End: 2021-11-11
Attending: EMERGENCY MEDICINE | Admitting: INTERNAL MEDICINE
Payer: COMMERCIAL

## 2021-11-10 ENCOUNTER — APPOINTMENT (OUTPATIENT)
Dept: CT IMAGING | Age: 46
DRG: 176 | End: 2021-11-10
Payer: COMMERCIAL

## 2021-11-10 VITALS
BODY MASS INDEX: 27.28 KG/M2 | TEMPERATURE: 101.4 F | SYSTOLIC BLOOD PRESSURE: 120 MMHG | WEIGHT: 154 LBS | HEART RATE: 140 BPM | OXYGEN SATURATION: 98 % | DIASTOLIC BLOOD PRESSURE: 74 MMHG

## 2021-11-10 DIAGNOSIS — R00.0 TACHYCARDIA: ICD-10-CM

## 2021-11-10 DIAGNOSIS — R00.0 TACHYCARDIA: Primary | ICD-10-CM

## 2021-11-10 DIAGNOSIS — I26.99 BILATERAL PULMONARY EMBOLISM (HCC): Primary | ICD-10-CM

## 2021-11-10 DIAGNOSIS — R05.9 COUGH: ICD-10-CM

## 2021-11-10 DIAGNOSIS — U07.1 COVID-19: ICD-10-CM

## 2021-11-10 DIAGNOSIS — R06.02 SOB (SHORTNESS OF BREATH): ICD-10-CM

## 2021-11-10 PROBLEM — F41.1 GENERALIZED ANXIETY DISORDER: Status: ACTIVE | Noted: 2021-05-05

## 2021-11-10 PROBLEM — K21.9 GASTROESOPHAGEAL REFLUX DISEASE: Status: ACTIVE | Noted: 2021-05-05

## 2021-11-10 PROBLEM — Z91.89 HIGH RISK FOR COLON CANCER: Status: ACTIVE | Noted: 2021-05-05

## 2021-11-10 LAB
ABSOLUTE EOS #: 0.03 K/UL (ref 0–0.44)
ABSOLUTE IMMATURE GRANULOCYTE: 0.05 K/UL (ref 0–0.3)
ABSOLUTE LYMPH #: 1.01 K/UL (ref 1.1–3.7)
ABSOLUTE MONO #: 0.81 K/UL (ref 0.1–1.2)
ALBUMIN SERPL-MCNC: 3.6 G/DL (ref 3.5–5.2)
ALBUMIN/GLOBULIN RATIO: 1.1 (ref 1–2.5)
ALP BLD-CCNC: 61 U/L (ref 35–104)
ALT SERPL-CCNC: 13 U/L (ref 5–33)
ANION GAP SERPL CALCULATED.3IONS-SCNC: 13 MMOL/L (ref 9–17)
AST SERPL-CCNC: 15 U/L
BASOPHILS # BLD: 0 % (ref 0–2)
BASOPHILS ABSOLUTE: <0.03 K/UL (ref 0–0.2)
BILIRUB SERPL-MCNC: 0.83 MG/DL (ref 0.3–1.2)
BUN BLDV-MCNC: 6 MG/DL (ref 6–20)
BUN/CREAT BLD: 12 (ref 9–20)
CALCIUM SERPL-MCNC: 8.7 MG/DL (ref 8.6–10.4)
CHLORIDE BLD-SCNC: 98 MMOL/L (ref 98–107)
CO2: 22 MMOL/L (ref 20–31)
CREAT SERPL-MCNC: 0.49 MG/DL (ref 0.5–0.9)
D-DIMER QUANTITATIVE: 10.39 MG/L FEU (ref 0–0.59)
DIFFERENTIAL TYPE: ABNORMAL
EKG ATRIAL RATE: 117 BPM
EKG P AXIS: 63 DEGREES
EKG P-R INTERVAL: 114 MS
EKG Q-T INTERVAL: 318 MS
EKG QRS DURATION: 72 MS
EKG QTC CALCULATION (BAZETT): 443 MS
EKG R AXIS: 81 DEGREES
EKG T AXIS: 70 DEGREES
EKG VENTRICULAR RATE: 117 BPM
EOSINOPHILS RELATIVE PERCENT: 0 % (ref 1–4)
GFR AFRICAN AMERICAN: >60 ML/MIN
GFR NON-AFRICAN AMERICAN: >60 ML/MIN
GFR SERPL CREATININE-BSD FRML MDRD: ABNORMAL ML/MIN/{1.73_M2}
GFR SERPL CREATININE-BSD FRML MDRD: ABNORMAL ML/MIN/{1.73_M2}
GLUCOSE BLD-MCNC: 108 MG/DL (ref 70–99)
HCG QUALITATIVE: NEGATIVE
HCT VFR BLD CALC: 35.8 % (ref 36.3–47.1)
HEMOGLOBIN: 12 G/DL (ref 11.9–15.1)
IMMATURE GRANULOCYTES: 1 %
LACTIC ACID, SEPSIS WHOLE BLOOD: NORMAL MMOL/L (ref 0.5–1.9)
LACTIC ACID, SEPSIS: 0.9 MMOL/L (ref 0.5–1.9)
LYMPHOCYTES # BLD: 10 % (ref 24–43)
MCH RBC QN AUTO: 28.5 PG (ref 25.2–33.5)
MCHC RBC AUTO-ENTMCNC: 33.5 G/DL (ref 25.2–33.5)
MCV RBC AUTO: 85 FL (ref 82.6–102.9)
MONOCYTES # BLD: 8 % (ref 3–12)
NRBC AUTOMATED: 0 PER 100 WBC
PARTIAL THROMBOPLASTIN TIME: 28.6 SEC (ref 23.9–33.8)
PDW BLD-RTO: 13.2 % (ref 11.8–14.4)
PLATELET # BLD: 333 K/UL (ref 138–453)
PLATELET ESTIMATE: ABNORMAL
PMV BLD AUTO: 9.1 FL (ref 8.1–13.5)
POTASSIUM SERPL-SCNC: 3.6 MMOL/L (ref 3.7–5.3)
RBC # BLD: 4.21 M/UL (ref 3.95–5.11)
RBC # BLD: ABNORMAL 10*6/UL
SEG NEUTROPHILS: 81 % (ref 36–65)
SEGMENTED NEUTROPHILS ABSOLUTE COUNT: 7.89 K/UL (ref 1.5–8.1)
SODIUM BLD-SCNC: 133 MMOL/L (ref 135–144)
TOTAL PROTEIN: 7 G/DL (ref 6.4–8.3)
TROPONIN INTERP: NORMAL
TROPONIN T: NORMAL NG/ML
TROPONIN, HIGH SENSITIVITY: <6 NG/L (ref 0–14)
WBC # BLD: 9.8 K/UL (ref 3.5–11.3)
WBC # BLD: ABNORMAL 10*3/UL

## 2021-11-10 PROCEDURE — 6360000004 HC RX CONTRAST MEDICATION: Performed by: EMERGENCY MEDICINE

## 2021-11-10 PROCEDURE — 93005 ELECTROCARDIOGRAM TRACING: CPT | Performed by: EMERGENCY MEDICINE

## 2021-11-10 PROCEDURE — 6360000002 HC RX W HCPCS: Performed by: EMERGENCY MEDICINE

## 2021-11-10 PROCEDURE — 83605 ASSAY OF LACTIC ACID: CPT

## 2021-11-10 PROCEDURE — 85379 FIBRIN DEGRADATION QUANT: CPT

## 2021-11-10 PROCEDURE — 96365 THER/PROPH/DIAG IV INF INIT: CPT

## 2021-11-10 PROCEDURE — 36415 COLL VENOUS BLD VENIPUNCTURE: CPT

## 2021-11-10 PROCEDURE — 2060000000 HC ICU INTERMEDIATE R&B

## 2021-11-10 PROCEDURE — 80053 COMPREHEN METABOLIC PANEL: CPT

## 2021-11-10 PROCEDURE — 2580000003 HC RX 258: Performed by: EMERGENCY MEDICINE

## 2021-11-10 PROCEDURE — 84484 ASSAY OF TROPONIN QUANT: CPT

## 2021-11-10 PROCEDURE — 99285 EMERGENCY DEPT VISIT HI MDM: CPT

## 2021-11-10 PROCEDURE — 85730 THROMBOPLASTIN TIME PARTIAL: CPT

## 2021-11-10 PROCEDURE — 71260 CT THORAX DX C+: CPT

## 2021-11-10 PROCEDURE — 96361 HYDRATE IV INFUSION ADD-ON: CPT

## 2021-11-10 PROCEDURE — 85025 COMPLETE CBC W/AUTO DIFF WBC: CPT

## 2021-11-10 PROCEDURE — 84703 CHORIONIC GONADOTROPIN ASSAY: CPT

## 2021-11-10 PROCEDURE — 99214 OFFICE O/P EST MOD 30 MIN: CPT | Performed by: NURSE PRACTITIONER

## 2021-11-10 PROCEDURE — 99222 1ST HOSP IP/OBS MODERATE 55: CPT | Performed by: NURSE PRACTITIONER

## 2021-11-10 RX ORDER — BENZONATATE 100 MG/1
200 CAPSULE ORAL 3 TIMES DAILY PRN
Status: DISCONTINUED | OUTPATIENT
Start: 2021-11-10 | End: 2021-11-11 | Stop reason: HOSPADM

## 2021-11-10 RX ORDER — POLYETHYLENE GLYCOL 3350 17 G/17G
17 POWDER, FOR SOLUTION ORAL DAILY PRN
Status: DISCONTINUED | OUTPATIENT
Start: 2021-11-10 | End: 2021-11-11 | Stop reason: HOSPADM

## 2021-11-10 RX ORDER — SODIUM CHLORIDE 0.9 % (FLUSH) 0.9 %
5-40 SYRINGE (ML) INJECTION PRN
Status: DISCONTINUED | OUTPATIENT
Start: 2021-11-10 | End: 2021-11-11 | Stop reason: HOSPADM

## 2021-11-10 RX ORDER — POTASSIUM CHLORIDE 20 MEQ/1
20 TABLET, EXTENDED RELEASE ORAL ONCE
Status: COMPLETED | OUTPATIENT
Start: 2021-11-11 | End: 2021-11-11

## 2021-11-10 RX ORDER — ALBUTEROL SULFATE 2.5 MG/3ML
2.5 SOLUTION RESPIRATORY (INHALATION)
Status: DISCONTINUED | OUTPATIENT
Start: 2021-11-10 | End: 2021-11-11 | Stop reason: HOSPADM

## 2021-11-10 RX ORDER — SODIUM CHLORIDE FOR INHALATION 0.9 %
3 VIAL, NEBULIZER (ML) INHALATION
Status: DISCONTINUED | OUTPATIENT
Start: 2021-11-10 | End: 2021-11-11 | Stop reason: HOSPADM

## 2021-11-10 RX ORDER — ACETAMINOPHEN 650 MG/1
650 SUPPOSITORY RECTAL EVERY 6 HOURS PRN
Status: DISCONTINUED | OUTPATIENT
Start: 2021-11-10 | End: 2021-11-11

## 2021-11-10 RX ORDER — 0.9 % SODIUM CHLORIDE 0.9 %
1000 INTRAVENOUS SOLUTION INTRAVENOUS ONCE
Status: COMPLETED | OUTPATIENT
Start: 2021-11-10 | End: 2021-11-10

## 2021-11-10 RX ORDER — ACETAMINOPHEN 325 MG/1
650 TABLET ORAL EVERY 6 HOURS PRN
Status: DISCONTINUED | OUTPATIENT
Start: 2021-11-10 | End: 2021-11-11

## 2021-11-10 RX ORDER — LEVOTHYROXINE AND LIOTHYRONINE 19; 4.5 UG/1; UG/1
30 TABLET ORAL
Status: DISCONTINUED | OUTPATIENT
Start: 2021-11-11 | End: 2021-11-11 | Stop reason: HOSPADM

## 2021-11-10 RX ORDER — HEPARIN SODIUM 10000 [USP'U]/100ML
18 INJECTION, SOLUTION INTRAVENOUS CONTINUOUS
Status: DISCONTINUED | OUTPATIENT
Start: 2021-11-10 | End: 2021-11-11 | Stop reason: HOSPADM

## 2021-11-10 RX ORDER — LORAZEPAM 2 MG/ML
0.5 INJECTION INTRAMUSCULAR EVERY 6 HOURS PRN
Status: DISCONTINUED | OUTPATIENT
Start: 2021-11-10 | End: 2021-11-11 | Stop reason: HOSPADM

## 2021-11-10 RX ORDER — ONDANSETRON 2 MG/ML
4 INJECTION INTRAMUSCULAR; INTRAVENOUS EVERY 6 HOURS PRN
Status: DISCONTINUED | OUTPATIENT
Start: 2021-11-10 | End: 2021-11-11 | Stop reason: HOSPADM

## 2021-11-10 RX ORDER — HEPARIN SODIUM 1000 [USP'U]/ML
40 INJECTION, SOLUTION INTRAVENOUS; SUBCUTANEOUS PRN
Status: DISCONTINUED | OUTPATIENT
Start: 2021-11-10 | End: 2021-11-11 | Stop reason: HOSPADM

## 2021-11-10 RX ORDER — SODIUM CHLORIDE 0.9 % (FLUSH) 0.9 %
5-40 SYRINGE (ML) INJECTION EVERY 12 HOURS SCHEDULED
Status: DISCONTINUED | OUTPATIENT
Start: 2021-11-11 | End: 2021-11-11 | Stop reason: HOSPADM

## 2021-11-10 RX ORDER — LEVOFLOXACIN 5 MG/ML
500 INJECTION, SOLUTION INTRAVENOUS EVERY 24 HOURS
Status: DISCONTINUED | OUTPATIENT
Start: 2021-11-10 | End: 2021-11-11 | Stop reason: HOSPADM

## 2021-11-10 RX ORDER — ONDANSETRON 4 MG/1
4 TABLET, ORALLY DISINTEGRATING ORAL EVERY 8 HOURS PRN
Status: DISCONTINUED | OUTPATIENT
Start: 2021-11-10 | End: 2021-11-11 | Stop reason: HOSPADM

## 2021-11-10 RX ORDER — FAMOTIDINE 20 MG/1
20 TABLET, FILM COATED ORAL 2 TIMES DAILY
Status: DISCONTINUED | OUTPATIENT
Start: 2021-11-11 | End: 2021-11-11 | Stop reason: HOSPADM

## 2021-11-10 RX ORDER — GUAIFENESIN/DEXTROMETHORPHAN 100-10MG/5
5 SYRUP ORAL EVERY 4 HOURS PRN
Status: DISCONTINUED | OUTPATIENT
Start: 2021-11-10 | End: 2021-11-11 | Stop reason: HOSPADM

## 2021-11-10 RX ORDER — SODIUM CHLORIDE 9 MG/ML
25 INJECTION, SOLUTION INTRAVENOUS PRN
Status: DISCONTINUED | OUTPATIENT
Start: 2021-11-10 | End: 2021-11-11 | Stop reason: HOSPADM

## 2021-11-10 RX ORDER — HEPARIN SODIUM 1000 [USP'U]/ML
80 INJECTION, SOLUTION INTRAVENOUS; SUBCUTANEOUS PRN
Status: DISCONTINUED | OUTPATIENT
Start: 2021-11-10 | End: 2021-11-11 | Stop reason: HOSPADM

## 2021-11-10 RX ORDER — HEPARIN SODIUM 1000 [USP'U]/ML
80 INJECTION, SOLUTION INTRAVENOUS; SUBCUTANEOUS ONCE
Status: COMPLETED | OUTPATIENT
Start: 2021-11-10 | End: 2021-11-10

## 2021-11-10 RX ADMIN — SODIUM CHLORIDE 1000 ML: 9 INJECTION, SOLUTION INTRAVENOUS at 19:11

## 2021-11-10 RX ADMIN — HEPARIN SODIUM 5590 UNITS: 1000 INJECTION INTRAVENOUS; SUBCUTANEOUS at 22:12

## 2021-11-10 RX ADMIN — LEVOFLOXACIN 500 MG: 5 INJECTION, SOLUTION INTRAVENOUS at 22:20

## 2021-11-10 RX ADMIN — HEPARIN SODIUM AND DEXTROSE 18 UNITS/KG/HR: 10000; 5 INJECTION INTRAVENOUS at 22:14

## 2021-11-10 RX ADMIN — IOPAMIDOL 100 ML: 755 INJECTION, SOLUTION INTRAVENOUS at 20:45

## 2021-11-10 ASSESSMENT — ENCOUNTER SYMPTOMS
CHEST TIGHTNESS: 1
COUGH: 1
BACK PAIN: 0
COUGH: 1
ABDOMINAL PAIN: 0
DIARRHEA: 0
NAUSEA: 0
SHORTNESS OF BREATH: 1
WHEEZING: 0
VOMITING: 0
SHORTNESS OF BREATH: 0
EYE PAIN: 0
NAUSEA: 1

## 2021-11-10 ASSESSMENT — PATIENT HEALTH QUESTIONNAIRE - PHQ9
SUM OF ALL RESPONSES TO PHQ QUESTIONS 1-9: 0
2. FEELING DOWN, DEPRESSED OR HOPELESS: 0
SUM OF ALL RESPONSES TO PHQ9 QUESTIONS 1 & 2: 0
1. LITTLE INTEREST OR PLEASURE IN DOING THINGS: 0
SUM OF ALL RESPONSES TO PHQ QUESTIONS 1-9: 0
SUM OF ALL RESPONSES TO PHQ QUESTIONS 1-9: 0

## 2021-11-10 NOTE — PROGRESS NOTES
[facility] [address] [place] [id number]  Dept: [phone]  Dept Fax: [phone]  Loc: [phone]        [address] [place] [address]       Chief Complaint   Patient presents with    Shortness of Breath     positive covid 16 days ago regeneron given given on day 10 of symptomes        Nurses Notes reviewed and I agree except as noted in the HPI. HISTORY OF PRESENT ILLNESS   Adarsh Morrissey is a 51 y.o. female who presents to [facility] today (11/10/2021) for evaluation of:   Cough  This is a new problem. The current episode started 1 to 4 weeks ago (10/25/21). The problem has been gradually worsening. The problem occurs every few minutes. The cough is non-productive. Associated symptoms include a fever, headaches (intermittent) and shortness of breath (slight). Pertinent negatives include no chest pain or wheezing. Associated symptoms comments: Fatigue. Treatments tried: ibuprofen. The treatment provided no relief. Received regeneron on Day 10 of symptoms. Pt broke out in a rash after infusion, was placed on 5 day course of prednisone but did not finish due to nausea and loss of appetite. Pt states has not been eating much, is drinking some water, estimates 1-2 bottles per day. Pt states cough is worsening, does feel some slight tightness in chest. Denies any palpitation but states had had an elevated HR in the past, used to take beta blockers but stopped. No hx of DVT or clotting issues with herself or family. REVIEW OF SYSTEMS     Review of Systems   Constitutional: Positive for appetite change, fatigue and fever. HENT: Negative for congestion. Respiratory: Positive for cough, chest tightness (mild) and shortness of breath (slight). Negative for wheezing. Cardiovascular: Negative for chest pain and palpitations. Gastrointestinal: Positive for nausea.    Neurological: Positive for weakness and headaches (intermittent). PAST MEDICAL HISTORY         Diagnosis Date    Goiter     Hypothyroidism        SURGICAL HISTORY     Patient  has a past surgical history that includes  section (2005) and Colonoscopy (2007). CURRENT MEDICATIONS       No facility-administered medications prior to visit. Outpatient Medications Prior to Visit   Medication Sig Dispense Refill    Thyroid 48.75 MG TABS Take 48.75 mg by mouth daily (Patient not taking: Reported on 11/10/2021)      ondansetron (ZOFRAN ODT) 4 MG disintegrating tablet Take 1 tablet by mouth every 8 hours as needed for Nausea (Patient not taking: Reported on 2021) 12 tablet 0    triamcinolone (KENALOG) 0.1 % cream Apply topically 3 times daily (Patient not taking: Reported on 2021) 80 g 1    levothyroxine (SYNTHROID) 112 MCG tablet Take 1 tablet by mouth Daily. (Patient not taking: Reported on 11/10/2021) 30 tablet 2       ALLERGIES     Patient is is allergic to codeine, other, and penicillins. FAMILY HISTORY     Patient's family history includes Colon Cancer in her mother; Emphysema in her father; High Blood Pressure in her brother and sister; Other in her mother, sister, and sister; Thyroid Disease in her mother. SOCIAL HISTORY     Patient  reports that she has quit smoking. She has never used smokeless tobacco. She reports that she does not drink alcohol and does not use drugs. PHYSICAL EXAM     VITALS  BP: 120/74, Temp: 101.4 °F (38.6 °C), Pulse: 140,  , SpO2: 98 %  Physical Exam  Vitals reviewed. Constitutional:       General: She is not in acute distress. Appearance: She is ill-appearing. Comments: Temp recheck during exam 100.8   Cardiovascular:      Rate and Rhythm: Regular rhythm. Tachycardia present. Heart sounds: Normal heart sounds. No murmur heard. Pulmonary:      Effort: No accessory muscle usage, prolonged expiration, respiratory distress or retractions. Comments: Respirations 16 during exam.  Pt ambulated in hallway; pule ox remains 97% but HR noted at 146 bpm. Occasional faint crackles noted upon auscultation. Skin:     General: Skin is warm and dry. Capillary Refill: Capillary refill takes less than 2 seconds. Coloration: Skin is pale. Neurological:      General: No focal deficit present. Mental Status: She is alert. DIAGNOSTIC RESULTS   Labs:No results found for this visit on 11/10/21. IMAGING:        CLINICAL COURSE:     Vitals:    11/10/21 1739   BP: 120/74   Site: Right Upper Arm   Position: Sitting   Cuff Size: Large Adult   Pulse: 140   Temp: 101.4 °F (38.6 °C)   TempSrc: Tympanic   SpO2: 98%   Weight: 154 lb (69.9 kg)           PROCEDURES:  None  FINAL IMPRESSION      1. Tachycardia    2. Cough    3. SOB (shortness of breath)         DISPOSITION/PLAN     Possible pneumonia. However, discussed with pt that her tachycardia is concerning, may be elevated due to fever and some dehydration but need to consider PE since she has had covid. Pt agrees to transfer to ER. Report called to AMANDA Moreland in the ER. Pt tranported ot ER via wheelchair. The use, risks, benefits, and potential side effects of prescribed and/or recommended medications were discussed. All questions were answered and the patient/caregiver voiced understanding. There are no Patient Instructions on file for this visit. No orders of the defined types were placed in this encounter. No facility-administered encounter medications on file as of 11/10/2021.      Outpatient Encounter Medications as of 11/10/2021   Medication Sig Dispense Refill    Thyroid 48.75 MG TABS Take 48.75 mg by mouth daily (Patient not taking: Reported on 11/10/2021)      ondansetron (ZOFRAN ODT) 4 MG disintegrating tablet Take 1 tablet by mouth every 8 hours as needed for Nausea (Patient not taking: Reported on 2/22/2021) 12 tablet 0    triamcinolone (KENALOG) 0.1 % cream Apply topically 3 times daily (Patient not taking: Reported on 2/22/2021) 80 g 1    levothyroxine (SYNTHROID) 112 MCG tablet Take 1 tablet by mouth Daily. (Patient not taking: Reported on 11/10/2021) 30 tablet 2       No follow-ups on file.                 Electronically signed by JAMES Max CNP on 11/10/2021 at 7:12 PM

## 2021-11-10 NOTE — ED PROVIDER NOTES
[facility]  eMERGENCY dEPARTMENT eNCOUnter      Pt Name: Carrie Zhang  MRN: [id number]  [place] 1975  Date of evaluation: 11/10/2021      CHIEF COMPLAINT       Chief Complaint   Patient presents with    Tachycardia    Positive For Covid-19     day 16         HISTORY OF PRESENT ILLNESS    Carrie Zhang is a 51 y.o. female who presents with complaints of tachycardia and persistent cough and fatigue patient says she has no appetite but no nausea vomiting the diarrhea is stopped she was positive for Covid on and this is day 17 of her symptoms she had Regeneron several days ago she said after Regeneron her taste came back. She went to urgent care and was found to have a resting pulse in the 140s so they sent her over here for evaluation she denies shortness of breath she just feels fatigued      REVIEW OF SYSTEMS         Review of Systems   Constitutional: Positive for activity change, appetite change, fatigue and fever. Negative for chills. HENT: Negative for congestion and ear pain. Eyes: Negative for pain and visual disturbance. Respiratory: Positive for cough. Negative for shortness of breath. Cardiovascular: Positive for palpitations. Negative for chest pain and leg swelling. Gastrointestinal: Negative for abdominal pain, diarrhea, nausea and vomiting. Endocrine: Negative for polydipsia and polyuria. Genitourinary: Negative for difficulty urinating, dysuria and frequency. Musculoskeletal: Negative for back pain, joint swelling, myalgias, neck pain and neck stiffness. Skin: Negative for rash. Neurological: Negative for dizziness, weakness and headaches. Hematological: Negative for adenopathy. Does not bruise/bleed easily. Psychiatric/Behavioral: Negative for confusion, self-injury and suicidal ideas. PAST MEDICAL HISTORY    has a past medical history of Goiter and Hypothyroidism.     SURGICAL HISTORY      has a past surgical history that includes  section (2005) and Colonoscopy (2007). CURRENT MEDICATIONS       Previous Medications    LEVOTHYROXINE (SYNTHROID) 112 MCG TABLET    Take 1 tablet by mouth Daily. ONDANSETRON (ZOFRAN ODT) 4 MG DISINTEGRATING TABLET    Take 1 tablet by mouth every 8 hours as needed for Nausea    THYROID 48.75 MG TABS    Take 48.75 mg by mouth daily    TRIAMCINOLONE (KENALOG) 0.1 % CREAM    Apply topically 3 times daily       ALLERGIES     is allergic to codeine, other, and penicillins. FAMILY HISTORY     She indicated that the status of her mother is unknown. She indicated that the status of her father is unknown. She indicated that the status of her brother is unknown.     family history includes Colon Cancer in her mother; Emphysema in her father; High Blood Pressure in her brother and sister; Other in her mother, sister, and sister; Thyroid Disease in her mother. SOCIAL HISTORY      reports that she has quit smoking. She has never used smokeless tobacco. She reports that she does not drink alcohol and does not use drugs. PHYSICAL EXAM     INITIAL VITALS:  temperature is 100.8 °F (38.2 °C). Her blood pressure is 110/68 and her pulse is 146. Her respiration is 16 and oxygen saturation is 97%. Physical Exam  Constitutional:       Appearance: Normal appearance. She is well-developed. She is not ill-appearing or diaphoretic. HENT:      Head: Normocephalic and atraumatic. Right Ear: External ear normal.      Left Ear: External ear normal.   Eyes:      Conjunctiva/sclera: Conjunctivae normal.      Pupils: Pupils are equal, round, and reactive to light. Cardiovascular:      Rate and Rhythm: Regular rhythm. Tachycardia present. Pulmonary:      Effort: Pulmonary effort is normal.      Breath sounds: Normal breath sounds. Abdominal:      General: Bowel sounds are normal.      Palpations: Abdomen is soft. Musculoskeletal:         General: No tenderness. Normal range of motion. Cervical back: Normal range of motion. Skin:     General: Skin is warm and dry. Capillary Refill: Capillary refill takes less than 2 seconds. Neurological:      General: No focal deficit present. Mental Status: She is alert and oriented to person, place, and time. Psychiatric:         Behavior: Behavior normal.           DIFFERENTIAL DIAGNOSIS/ MDM:     Tachycardia fatigue history of Covid will do a work-up rule out PE    DIAGNOSTIC RESULTS     EKG: All EKG's are interpreted by the Emergency Department Physician who either signs or Co-signs this chart in the absence of a cardiologist.  EKG sinus tachycardia rate of 117 bpm MD interval is 114 ms QRS duration 72 ms QT corrected 443 ms axis is 81 there is no acute ST or T wave changes seen      RADIOLOGY:   I directly visualized the following  images and reviewed the radiologist interpretations:          ED BEDSIDE ULTRASOUND:       LABS:  Labs Reviewed   COMPREHENSIVE METABOLIC PANEL   CBC WITH AUTO DIFFERENTIAL   TROPONIN   D-DIMER, QUANTITATIVE   HCG, SERUM, QUALITATIVE           EMERGENCY DEPARTMENT COURSE:   Vitals:    Vitals:    11/10/21 1818   BP: 110/68   Pulse: 146   Resp: 16   Temp: 100.8 °F (38.2 °C)   SpO2: 97%     -------------------------  BP: 110/68, Temp: 100.8 °F (38.2 °C), Pulse: 146, Resp: 16        Re-evaluation Notes        CRITICAL CARE:   None        CONSULTS:      PROCEDURES:  None    FINAL IMPRESSION    No diagnosis found. DISPOSITION/PLAN   DISPOSITION of this patient is passed to the oncoming physician at the end of my shift 1930 hrs. pending results    Condition on Disposition    Stable    PATIENT REFERRED TO:  No follow-up provider specified.     DISCHARGE MEDICATIONS:  New Prescriptions    No medications on file       (Please note that portions of this note were completed with a voice recognition program.  Efforts were made to edit the dictations but occasionally words are mis-transcribed.)    Ulices Garcia,

## 2021-11-11 ENCOUNTER — APPOINTMENT (OUTPATIENT)
Dept: INTERVENTIONAL RADIOLOGY/VASCULAR | Age: 46
DRG: 176 | End: 2021-11-11
Payer: COMMERCIAL

## 2021-11-11 VITALS
HEART RATE: 94 BPM | TEMPERATURE: 98.6 F | BODY MASS INDEX: 28.84 KG/M2 | DIASTOLIC BLOOD PRESSURE: 67 MMHG | SYSTOLIC BLOOD PRESSURE: 124 MMHG | HEIGHT: 63 IN | RESPIRATION RATE: 15 BRPM | WEIGHT: 162.8 LBS | OXYGEN SATURATION: 98 %

## 2021-11-11 LAB
ABSOLUTE EOS #: 0.03 K/UL (ref 0–0.44)
ABSOLUTE IMMATURE GRANULOCYTE: 0.04 K/UL (ref 0–0.3)
ABSOLUTE LYMPH #: 1.53 K/UL (ref 1.1–3.7)
ABSOLUTE MONO #: 0.9 K/UL (ref 0.1–1.2)
ALBUMIN SERPL-MCNC: 3.4 G/DL (ref 3.5–5.2)
ALBUMIN/GLOBULIN RATIO: 1 (ref 1–2.5)
ALP BLD-CCNC: 58 U/L (ref 35–104)
ALT SERPL-CCNC: 12 U/L (ref 5–33)
ANION GAP SERPL CALCULATED.3IONS-SCNC: 13 MMOL/L (ref 9–17)
AST SERPL-CCNC: 14 U/L
BASOPHILS # BLD: 0 % (ref 0–2)
BASOPHILS ABSOLUTE: 0.03 K/UL (ref 0–0.2)
BILIRUB SERPL-MCNC: 0.88 MG/DL (ref 0.3–1.2)
BUN BLDV-MCNC: 4 MG/DL (ref 6–20)
BUN/CREAT BLD: 9 (ref 9–20)
C-REACTIVE PROTEIN: 83 MG/L (ref 0–5)
CALCIUM SERPL-MCNC: 8.3 MG/DL (ref 8.6–10.4)
CHLORIDE BLD-SCNC: 102 MMOL/L (ref 98–107)
CO2: 21 MMOL/L (ref 20–31)
CREAT SERPL-MCNC: 0.46 MG/DL (ref 0.5–0.9)
D-DIMER QUANTITATIVE: 4.98 MG/L FEU (ref 0–0.59)
DIFFERENTIAL TYPE: ABNORMAL
EKG ATRIAL RATE: 104 BPM
EKG P AXIS: 63 DEGREES
EKG P-R INTERVAL: 126 MS
EKG Q-T INTERVAL: 336 MS
EKG QRS DURATION: 76 MS
EKG QTC CALCULATION (BAZETT): 441 MS
EKG R AXIS: 65 DEGREES
EKG T AXIS: 62 DEGREES
EKG VENTRICULAR RATE: 104 BPM
EOSINOPHILS RELATIVE PERCENT: 0 % (ref 1–4)
GFR AFRICAN AMERICAN: >60 ML/MIN
GFR NON-AFRICAN AMERICAN: >60 ML/MIN
GFR SERPL CREATININE-BSD FRML MDRD: ABNORMAL ML/MIN/{1.73_M2}
GFR SERPL CREATININE-BSD FRML MDRD: ABNORMAL ML/MIN/{1.73_M2}
GLUCOSE BLD-MCNC: 114 MG/DL (ref 70–99)
HCT VFR BLD CALC: 33.1 % (ref 36.3–47.1)
HEMOGLOBIN: 11.1 G/DL (ref 11.9–15.1)
IMMATURE GRANULOCYTES: 0 %
LACTATE DEHYDROGENASE: 251 U/L (ref 135–214)
LV EF: 60 %
LVEF MODALITY: NORMAL
LYMPHOCYTES # BLD: 15 % (ref 24–43)
MAGNESIUM: 1.7 MG/DL (ref 1.6–2.6)
MCH RBC QN AUTO: 28.5 PG (ref 25.2–33.5)
MCHC RBC AUTO-ENTMCNC: 33.5 G/DL (ref 25.2–33.5)
MCV RBC AUTO: 84.9 FL (ref 82.6–102.9)
MONOCYTES # BLD: 9 % (ref 3–12)
NRBC AUTOMATED: 0 PER 100 WBC
PARTIAL THROMBOPLASTIN TIME: 107.2 SEC (ref 23.9–33.8)
PARTIAL THROMBOPLASTIN TIME: 69.4 SEC (ref 23.9–33.8)
PDW BLD-RTO: 13.3 % (ref 11.8–14.4)
PLATELET # BLD: 311 K/UL (ref 138–453)
PLATELET ESTIMATE: ABNORMAL
PMV BLD AUTO: 9.2 FL (ref 8.1–13.5)
POTASSIUM SERPL-SCNC: 3.5 MMOL/L (ref 3.7–5.3)
RBC # BLD: 3.9 M/UL (ref 3.95–5.11)
RBC # BLD: ABNORMAL 10*6/UL
SEG NEUTROPHILS: 76 % (ref 36–65)
SEGMENTED NEUTROPHILS ABSOLUTE COUNT: 7.97 K/UL (ref 1.5–8.1)
SODIUM BLD-SCNC: 136 MMOL/L (ref 135–144)
THYROXINE, FREE: 0.92 NG/DL (ref 0.93–1.7)
TOTAL PROTEIN: 6.8 G/DL (ref 6.4–8.3)
TSH SERPL DL<=0.05 MIU/L-ACNC: 14.87 MIU/L (ref 0.3–5)
WBC # BLD: 10.5 K/UL (ref 3.5–11.3)
WBC # BLD: ABNORMAL 10*3/UL

## 2021-11-11 PROCEDURE — 80053 COMPREHEN METABOLIC PANEL: CPT

## 2021-11-11 PROCEDURE — 6370000000 HC RX 637 (ALT 250 FOR IP): Performed by: INTERNAL MEDICINE

## 2021-11-11 PROCEDURE — 84443 ASSAY THYROID STIM HORMONE: CPT

## 2021-11-11 PROCEDURE — 99254 IP/OBS CNSLTJ NEW/EST MOD 60: CPT | Performed by: INTERNAL MEDICINE

## 2021-11-11 PROCEDURE — 83615 LACTATE (LD) (LDH) ENZYME: CPT

## 2021-11-11 PROCEDURE — 85025 COMPLETE CBC W/AUTO DIFF WBC: CPT

## 2021-11-11 PROCEDURE — 85379 FIBRIN DEGRADATION QUANT: CPT

## 2021-11-11 PROCEDURE — 93306 TTE W/DOPPLER COMPLETE: CPT

## 2021-11-11 PROCEDURE — 93970 EXTREMITY STUDY: CPT

## 2021-11-11 PROCEDURE — 84439 ASSAY OF FREE THYROXINE: CPT

## 2021-11-11 PROCEDURE — 36415 COLL VENOUS BLD VENIPUNCTURE: CPT

## 2021-11-11 PROCEDURE — 93005 ELECTROCARDIOGRAM TRACING: CPT | Performed by: NURSE PRACTITIONER

## 2021-11-11 PROCEDURE — 83735 ASSAY OF MAGNESIUM: CPT

## 2021-11-11 PROCEDURE — 86140 C-REACTIVE PROTEIN: CPT

## 2021-11-11 PROCEDURE — 85730 THROMBOPLASTIN TIME PARTIAL: CPT

## 2021-11-11 PROCEDURE — 99222 1ST HOSP IP/OBS MODERATE 55: CPT | Performed by: INTERNAL MEDICINE

## 2021-11-11 PROCEDURE — 6370000000 HC RX 637 (ALT 250 FOR IP): Performed by: NURSE PRACTITIONER

## 2021-11-11 PROCEDURE — 2580000003 HC RX 258: Performed by: NURSE PRACTITIONER

## 2021-11-11 RX ORDER — METOPROLOL SUCCINATE 25 MG/1
25 TABLET, EXTENDED RELEASE ORAL DAILY
Status: DISCONTINUED | OUTPATIENT
Start: 2021-11-11 | End: 2021-11-11 | Stop reason: HOSPADM

## 2021-11-11 RX ORDER — GREEN TEA/HOODIA GORDONII 315-12.5MG
1 CAPSULE ORAL 3 TIMES DAILY
Qty: 30 TABLET | Refills: 0 | COMMUNITY
Start: 2021-11-11 | End: 2021-11-21

## 2021-11-11 RX ORDER — LEVOTHYROXINE AND LIOTHYRONINE 19; 4.5 UG/1; UG/1
30 TABLET ORAL
Qty: 30 TABLET | Refills: 0
Start: 2021-11-12 | End: 2022-01-28

## 2021-11-11 RX ORDER — POTASSIUM CHLORIDE 20 MEQ/1
40 TABLET, EXTENDED RELEASE ORAL ONCE
Status: COMPLETED | OUTPATIENT
Start: 2021-11-11 | End: 2021-11-11

## 2021-11-11 RX ORDER — BENZONATATE 200 MG/1
200 CAPSULE ORAL 3 TIMES DAILY PRN
Qty: 21 CAPSULE | Refills: 0 | Status: SHIPPED | OUTPATIENT
Start: 2021-11-11 | End: 2021-11-18

## 2021-11-11 RX ORDER — METOPROLOL SUCCINATE 25 MG/1
25 TABLET, EXTENDED RELEASE ORAL DAILY
Qty: 30 TABLET | Refills: 0 | Status: SHIPPED | OUTPATIENT
Start: 2021-11-12 | End: 2021-11-17 | Stop reason: ALTCHOICE

## 2021-11-11 RX ORDER — LEVOFLOXACIN 750 MG/1
750 TABLET ORAL DAILY
Qty: 6 TABLET | Refills: 0 | Status: SHIPPED | OUTPATIENT
Start: 2021-11-11 | End: 2021-11-17

## 2021-11-11 RX ORDER — ACETAMINOPHEN 325 MG/1
650 TABLET ORAL EVERY 4 HOURS PRN
Status: DISCONTINUED | OUTPATIENT
Start: 2021-11-11 | End: 2021-11-11 | Stop reason: HOSPADM

## 2021-11-11 RX ADMIN — POTASSIUM CHLORIDE 20 MEQ: 20 TABLET, EXTENDED RELEASE ORAL at 00:40

## 2021-11-11 RX ADMIN — FAMOTIDINE 20 MG: 20 TABLET, FILM COATED ORAL at 08:06

## 2021-11-11 RX ADMIN — SODIUM CHLORIDE 25 ML: 9 INJECTION, SOLUTION INTRAVENOUS at 00:39

## 2021-11-11 RX ADMIN — FAMOTIDINE 20 MG: 20 TABLET, FILM COATED ORAL at 00:40

## 2021-11-11 RX ADMIN — ACETAMINOPHEN 650 MG: 325 TABLET ORAL at 10:19

## 2021-11-11 RX ADMIN — SODIUM CHLORIDE, PRESERVATIVE FREE 10 ML: 5 INJECTION INTRAVENOUS at 08:06

## 2021-11-11 RX ADMIN — METOPROLOL SUCCINATE 25 MG: 25 TABLET, EXTENDED RELEASE ORAL at 13:37

## 2021-11-11 RX ADMIN — POTASSIUM CHLORIDE 40 MEQ: 20 TABLET, EXTENDED RELEASE ORAL at 07:02

## 2021-11-11 RX ADMIN — BENZONATATE 200 MG: 100 CAPSULE ORAL at 03:32

## 2021-11-11 RX ADMIN — ACETAMINOPHEN 650 MG: 325 TABLET ORAL at 05:59

## 2021-11-11 ASSESSMENT — PAIN DESCRIPTION - LOCATION
LOCATION: RIB CAGE
LOCATION: HEAD

## 2021-11-11 ASSESSMENT — PAIN DESCRIPTION - PAIN TYPE
TYPE: ACUTE PAIN
TYPE: ACUTE PAIN

## 2021-11-11 ASSESSMENT — PAIN DESCRIPTION - ORIENTATION: ORIENTATION: RIGHT

## 2021-11-11 ASSESSMENT — PAIN SCALES - GENERAL
PAINLEVEL_OUTOF10: 2
PAINLEVEL_OUTOF10: 0
PAINLEVEL_OUTOF10: 3
PAINLEVEL_OUTOF10: 3
PAINLEVEL_OUTOF10: 0

## 2021-11-11 NOTE — H&P
HOSPITALIST ADMISSION H&P      REASON FOR ADMISSION: Acute bilateral pulmonary emboli -- Covid-19 pneumonia  ESTIMATED LENGTH OF STAY:>2 midnights, 3-4 days    ATTENDING/ADMITTING PHYSICIAN: Ivana Bolden MD  PCP: No primary care provider on file. HISTORY OF PRESENT ILLNESS:      The patient is a 51 y.o. female patient of No primary care provider on file. who presents from the ER (sent from Urgent Care) with c/o mild shortness of breath, persistent nonproductive cough, fatigue, and low appetite. She was sent to ER due to tachycardia with heart rate in the 140s. She tested positive for COVID-19 on 2021 with symptom onset 2021. She was not vaccinated against COVID-19 and states she contracted the virus from her . She received Regeneron in November 3, 2021. She states she has chronic tachycardia with heart rate typically 100's-110's. She denies any previous DVTs or PEs. In ER, D. Dimer 10.39. CTA of the chest impression: 1. Acute bilateral pulmonary emboli. No right heart strain by CT. 2. Multifocal ground-glass the confluent opacities could represent pulmonary infarcts in the absence of clinical concern for pneumonia. EKG showed sinus tachycardia. Troponin <6. Temperature maximum 100.8F, WBC 9.8, lactic acid 0.9. Hypokalemia -- 3.6    Hypothyroidism      See below for additional PMH. Patient mwvd-hlewdlhoyn-ttbifkyl-available records reviewed, including, but not limited to ER records, imaging results, lab results, office records, personal records, and OARRS -- no signs of abuse or diversion.      Past Medical History:   Diagnosis Date    Goiter     Hypothyroidism     Tachycardia     chronic           Past Surgical History:   Procedure Laterality Date     SECTION  2005    COLONOSCOPY  2007    with distal ileoscopy and biopsy of the distal ileum and biopsy of the rectum; nonspecific proctitis, otherwise normal colon and normal distal ileum without Guarding or Rebound Tenderness  : Not examined  Extremities/Musculoskeletal: All four extremities without edema and Generalized weakness  Skin: No Cyanosis and Skin warm and dry  Neuro: Alert and Oriented, to Person, to Time, to Place, to Situation and No Localizing Signs/Symptoms  Psychiatric: Normal mood and affect      LABS:    CBC with Differential:    Lab Results   Component Value Date    WBC 9.8 11/10/2021    RBC 4.21 11/10/2021    HGB 12.0 11/10/2021    HCT 35.8 11/10/2021     11/10/2021    MCV 85.0 11/10/2021    MCH 28.5 11/10/2021    MCHC 33.5 11/10/2021    RDW 13.2 11/10/2021    LYMPHOPCT 10 11/10/2021    MONOPCT 8 11/10/2021    BASOPCT 0 11/10/2021    MONOSABS 0.81 11/10/2021    LYMPHSABS 1.01 11/10/2021    EOSABS 0.03 11/10/2021    BASOSABS <0.03 11/10/2021    DIFFTYPE NOT REPORTED 11/10/2021     CMP:    Lab Results   Component Value Date     11/10/2021    K 3.6 11/10/2021    CL 98 11/10/2021    CO2 22 11/10/2021    BUN 6 11/10/2021    CREATININE 0.49 11/10/2021    GFRAA >60 11/10/2021    LABGLOM >60 11/10/2021    GLUCOSE 108 11/10/2021    PROT 7.0 11/10/2021    LABALBU 3.6 11/10/2021    CALCIUM 8.7 11/10/2021    BILITOT 0.83 11/10/2021    ALKPHOS 61 11/10/2021    AST 15 11/10/2021    ALT 13 11/10/2021       ASSESSMENT:      Patient Active Problem List   Diagnosis    COVID-19    Gastroesophageal reflux disease    Generalized anxiety disorder    High risk for colon cancer    Pulmonary embolism, bilateral ([facility] [address]     Patient kfqb-rdoqwgxzfe-oofrounk-available records reviewed, including, but not limited to,  ER reports--labs--imaging---EKGs---ofice records---personal notes    Eleno Phalen J.  51  WF  Maite Wilson, DO--patino]  FULL CODE     HEPARIN CONTUNUOUS INFUSION  à XARELTO  COVID19---POSITIVE---10.26.2021SYMPTOM ONSET10.24.2021UNVACCINATEDREGENERON---11.3.2021    Anti-infectives:  Levaquin 500 IVrenal      Bilateral PE---pulmonary emboli---11.10.2021 2D ECHO---2021NLVSFNRVSFnormal AR 3.1 cm---normal IVC diameter and                                 collapse---cannot evaluate DD----LVEF ~ 60%            DUS----2021---BLE no DVT  COVID19pneumonia----11.10.2021           CTA chestpulmonary---11.10.2021---acute bilateral PE---no right heart strain----multifocal                                ground glass confluent opacitiespossible pulmonary infarctions            EKG---2021---sinus tachycardia---104otherwise[-]            EKG---11.10.2021---sinus tachycardia--117  Hypokalemia      Tachycardia---chronic---11.10.2021  Hypothyroidism   GERD  CKDStage 2  Generalized anxiety disorder---EUGENIA  Tobacco abuse---quit--  PMH:   goiter   PSH:   200, colonoscopy---    Allergies:         penicillin, Regeneron---monoclonal antibodies---rash   Intolerances:   codeine--nausea         Attending Supervising [de-identified] Attestation Statement  I performed a history and physical examination on the patient and discussed the management with the nurse practitioner. I reviewed and agree with the findings and plan as documented in her note . Electronically signed by Miroslava Porter on 21 at 1:52 PM EST      PLAN:    1. Acute bilateral pulmonary emboli -- IV heparin gtt, telemetry monitoring, 2D echo, cardiology consult, AM EKG  2. Covid-19 pneumonia -- IV antibiotics, RT protocols, supplemental oxygen prn, albuterol prn  3. Hypokalemia -- replaced, recheck in AM  4. Hypothyroidism -- TSH pending  5. Home medications reviewed  6.  See orders     Note that over 50 minutes was spent in evaluation of the patient, review of the chart and pertinent records, discussion with family/staff, etc    JAMES Sahni - CNP,NP-C, FNP-BC  1:40 AM  2021     (Please note that portions of this note were completed with a voice recognition program. Efforts are made to edit these dictations, but occasionally words are mis-transcribed.) Home--11.11.2021  PE--Xarelto  Levaquin 750--to finish course  Toprol XL  25 daily  Probiotics   [place] thyroid 30 mg PO daily  Follow up Dr. Brady Chin, FP  See orders

## 2021-11-11 NOTE — PLAN OF CARE
Problem: Airway Clearance - Ineffective  Goal: Achieve or maintain patent airway  Outcome: Ongoing     Problem: Gas Exchange - Impaired  Goal: Absence of hypoxia  Outcome: Ongoing  Goal: Promote optimal lung function  Outcome: Ongoing     Problem: Breathing Pattern - Ineffective  Goal: Ability to achieve and maintain a regular respiratory rate  Outcome: Ongoing     Problem:  Body Temperature -  Risk of, Imbalanced  Goal: Ability to maintain a body temperature within defined limits  Outcome: Ongoing  Goal: Will regain or maintain usual level of consciousness  Outcome: Ongoing  Goal: Complications related to the disease process, condition or treatment will be avoided or minimized  Outcome: Ongoing     Problem: Isolation Precautions - Risk of Spread of Infection  Goal: Prevent transmission of infection  Outcome: Ongoing     Problem: Nutrition Deficits  Goal: Optimize nutritional status  Outcome: Ongoing     Problem: Risk for Fluid Volume Deficit  Goal: Maintain normal heart rhythm  Outcome: Ongoing  Goal: Maintain absence of muscle cramping  Outcome: Ongoing  Goal: Maintain normal serum potassium, sodium, calcium, phosphorus, and pH  Outcome: Ongoing     Problem: Loneliness or Risk for Loneliness  Goal: Demonstrate positive use of time alone when socialization is not possible  Outcome: Ongoing     Problem: Fatigue  Goal: Verbalize increase energy and improved vitality  Outcome: Ongoing     Problem: Patient Education: Go to Patient Education Activity  Goal: Patient/Family Education  Outcome: Ongoing     Problem: Tissue Perfusion - Peripheral, Altered:  Goal: Electrolytes within specified parameters  Description: Electrolytes within specified parameters  Outcome: Ongoing  Goal: Hemodynamic stability will improve  Description: Hemodynamic stability will improve  Outcome: Ongoing  Goal: Circulatory function within specified parameters  Description: Circulatory function within specified parameters  Outcome: Ongoing

## 2021-11-11 NOTE — FLOWSHEET NOTE
rounding on PCU    Assessment: Patient is sitting up in bed. She has good family and Judaism support. Bobo Hauser has been notified by family. Intervention: Engaged in conversation.  prayed with patient. Patient expressed appreciation for visit and offer of continued prayer. Plan: Chaplains are available on site or on call 24/7 for spiritual and emotional support. 11/11/21 1406   Encounter Summary   Services provided to: Patient   Referral/Consult From: Rounding   Support System Spouse; Orthodox/scott community   Contact Orthodox Completed  (by family)   Continue Visiting   (11/11/21)   Complexity of Encounter Moderate   Length of Encounter 15 minutes   Routine   Type Initial   Spiritual/Zoroastrianism   Type Spiritual support   Assessment Approachable   Intervention Active listening; Prayer   Outcome Expressed gratitude; Engaged in conversation        11/11/21 1406   Encounter Summary   Services provided to: Patient   Referral/Consult From: [address]; Orthodox/scott community   Contact Orthodox Completed  (by family)   Continue Visiting   (11/11/21)   Complexity of Encounter Moderate   Length of Encounter 15 minutes   Routine   Type Initial   Spiritual/Zoroastrianism   Type Spiritual support   Assessment Approachable   Intervention Active listening; Prayer   Outcome Expressed gratitude; Engaged in conversation        11/11/21 1406   Encounter Summary   Services provided to: Patient   Referral/Consult From: [address];  Orthodox/scott community   Contact Orthodox Completed  (by family)   Continue Visiting   (11/11/21)   Complexity of Encounter Moderate   Length of Encounter 15 minutes   Routine   Type Initial   Spiritual/Zoroastrianism   Type Spiritual support   Assessment Approachable   Intervention Active listening; Prayer   Outcome Expressed gratitude; Engaged in conversation

## 2021-11-11 NOTE — CONSULTS
pack-year smoking history. She has never used smokeless tobacco. She reports that she does not drink alcohol and does not use drugs. Family History:    for early CAD    REVIEW OF SYSTEMS:    · Constitutional: there has been no unanticipated weight loss. No change in functional capacity. · Eyes: No visual changes or diplopia. · ENT: No Headaches, hearing loss or vertigo. No mouth sores or sore throat. · Cardiovascular: -ve chest pain as mentioned above, +ve for exertional dyspnea, orthopnea, palpitations or pre syncope  · Respiratory: No hx of productive cough, pleuritic chest pain   · Gastrointestinal: No abdominal pain, appetite loss, blood in stools. No change in bowel habits. · Genitourinary: No dysuria, trouble voiding, or hematuria. · Musculoskeletal:  No gait disturbance, No weakness or joint complaints. · Integumentary: No rash or pruritis. · Neurological: No headache, weakness, numbness or tingling. No change in gait, balance, coordination. · Psychiatric: No anxiety, or depression. · Endocrine: No temperature intolerance. No excessive thirst, fluid intake, or urination. No tremor. · Hematologic/Lymphatic: No abnormal bruising or bleeding, blood clots or swollen lymph nodes. · Allergic/Immunologic: No nasal congestion or hives. PHYSICAL EXAM:    Physical Examination:    /67   Pulse 94   Temp 98.6 °F (37 °C) (Tympanic)   Resp 15   Ht 5' 3\" (1.6 m)   Wt 162 lb 12.8 oz (73.8 kg)   LMP 10/31/2021   SpO2 98%   BMI 28.84 kg/m²    Constitutional and General Appearance: alert, cooperative, in no distress   HEENT: PERRL, no cervical lymphadenopathy. Normal oral mucosa  Respiratory:  · Normal excursion and expansion without use of accessory muscles  · Resp Auscultation: Good respiratory effort. No for increased work of breathing. On auscultation: clear to auscultation bilaterally, no wheezing, no rales.    Cardiovascular:  · The apical impulse is not displaced  · Heart tones are crisp and normal. regular S1 and S2. Murmurs: None   · Jugular venous pulsation Normal  · Thre is no carotid bruit   · Peripheral pulses are symmetrical and full   Abdomen:  · No masses or tenderness  · Bowel sounds present  Extremities:  ·  No Cyanosis or Clubbing  ·  Lower extremity edema: No edema   ·  Skin: Warm and dry  Neurological:  · Not done     DATA:    Diagnostics:      EKG:  Sinus Tachycardia, otherwise normal ECG    CTA chest 11/10/2021  1. Acute bilateral pulmonary emboli.  No right heart strain by CT. 2. Multifocal ground-glass the confluent opacities could represent pulmonary   infarcts in the absence of clinical concern for pneumonia. .   3. Other findings as described. TTE 11.11.2021  Summary  Normal LV size and systolic function. Estimated ejection fraction is 60%. No significant valvular regurgitation or stenosis seen. No evidence of pericardial effusion. Labs:     CBC:   Recent Labs     11/10/21  1911 11/11/21  0338   WBC 9.8 10.5   HGB 12.0 11.1*   HCT 35.8* 33.1*    311     BMP:   Recent Labs     11/10/21  1911 11/11/21  0338   * 136   K 3.6* 3.5*   CO2 22 21   BUN 6 4*   CREATININE 0.49* 0.46*   LABGLOM >60 >60   GLUCOSE 108* 114*     BNP: No results for input(s): BNP in the last 72 hours. PT/INR: No results for input(s): PROTIME, INR in the last 72 hours. APTT:  Recent Labs     11/11/21 0338 11/11/21  0918   APTT 107.2* 69.4*     CARDIAC ENZYMES:No results for input(s): CKTOTAL, CKMB, CKMBINDEX, TROPONINI in the last 72 hours. FASTING LIPID PANEL:No results found for: HDL, LDLDIRECT, LDLCALC, TRIG  LIVER PROFILE:  Recent Labs     11/10/21  1911 11/11/21  0338   AST 15 14   ALT 13 12   LABALBU 3.6 3.4*         IMPRESSION:    Acute bilateral pulmonary embolism  Recent COVID-19 pneumonia  Sinus tachycardia  Hypothyroidism      RECOMMENDATIONS:  1. No right heart strain noted on CTA as well as echocardiogram  2. Start Toprol-XL 25 mg at night  3.  Continue therapeutic anticoagulation with heparin and DOAC on discharge          Alex Prakash MD, [facility]

## 2021-11-11 NOTE — DISCHARGE INSTR - DIET

## 2021-11-11 NOTE — ED PROVIDER NOTES
ADDENDUM:      Care of this patient was assumed from Dr. Madhuri Nunes. The patient was seen for Tachycardia and Positive For Covid-19 (day 17)  . The patient's initial evaluation and plan have been discussed with the prior provider who initially evaluated the patient. Nursing Notes, Past Medical Hx, Past Surgical Hx, Social Hx, Family Hx, Medications and Allergies, and  were all reviewed. Diagnostic Results     EKG   Please see Dr. Fernando Frias interpretation. RADIOLOGY:  CT CHEST PULMONARY EMBOLISM W CONTRAST    Addendum Date: 11/10/2021    ADDENDUM: Critical results were called by Dr. Crystal Lewis DO to Dr. Lázaro Man on 11/10/2021 at 21:17. Result Date: 11/10/2021  EXAMINATION: CTA OF THE CHEST 11/10/2021 8:36 pm TECHNIQUE: CTA of the chest was performed after the administration of intravenous contrast.  Multiplanar reformatted images are provided for review. MIP images are provided for review. Dose modulation, iterative reconstruction, and/or weight based adjustment of the mA/kV was utilized to reduce the radiation dose to as low as reasonably achievable. COMPARISON: None. HISTORY: ORDERING SYSTEM PROVIDED HISTORY: tachycardia / shortness of breath  / very elevated d-dimer TECHNOLOGIST PROVIDED HISTORY: tachycardia / shortness of breath  / very elevated d-dimer Decision Support Exception - unselect if not a suspected or confirmed emergency medical condition->Emergency Medical Condition ([place]) Reason for Exam: COVID, elevated D-dimer cough Acuity: Acute Type of Exam: Initial FINDINGS: Pulmonary Arteries: Pulmonary arteries are within normal limits in size. . Filling defects in lobar, segmental, and subsegmental right middle and lower lobes. Filling defect in subsegmental lingula. Filling defects in segmental and subsegmental left lower lobe. No right heart strain by CT. Nida Schneider Mediastinum: Heart is within normal limits in size. No pericardial effusion. Aorta is within normal limits in size.  No luminal defect is appreciated in the visualized thoracic aorta. Lungs/pleura: Central airways are patent. Ground-glass to confluent opacities. Trace right pleural effusion. No pneumothorax. Soft Tissues/Bones: No adenopathy. Upper Abdomen: Limited images of the upper abdomen are unremarkable. 1. Acute bilateral pulmonary emboli. No right heart strain by CT. 2. Multifocal ground-glass the confluent opacities could represent pulmonary infarcts in the absence of clinical concern for pneumonia. . 3. Other findings as described.        LABS:   Results for orders placed or performed during the hospital encounter of 11/10/21   Comprehensive Metabolic Panel   Result Value Ref Range    Glucose 108 (H) 70 - 99 mg/dL    BUN 6 6 - 20 mg/dL    CREATININE 0.49 (L) 0.50 - 0.90 mg/dL    Bun/Cre Ratio 12 9 - 20    Calcium 8.7 8.6 - 10.4 mg/dL    Sodium 133 (L) 135 - 144 mmol/L    Potassium 3.6 (L) 3.7 - 5.3 mmol/L    Chloride 98 98 - 107 mmol/L    CO2 22 20 - 31 mmol/L    Anion Gap 13 9 - 17 mmol/L    Alkaline Phosphatase 61 35 - 104 U/L    ALT 13 5 - 33 U/L    AST 15 <32 U/L    Total Bilirubin 0.83 0.3 - 1.2 mg/dL    Total Protein 7.0 6.4 - 8.3 g/dL    Albumin 3.6 3.5 - 5.2 g/dL    Albumin/Globulin Ratio 1.1 1.0 - 2.5    GFR Non-African American >60 >60 mL/min    GFR African American >60 >60 mL/min    GFR Comment          GFR Staging NOT REPORTED    CBC Auto Differential   Result Value Ref Range    WBC 9.8 3.5 - 11.3 k/uL    RBC 4.21 3.95 - 5.11 m/uL    Hemoglobin 12.0 11.9 - 15.1 g/dL    Hematocrit 35.8 (L) 36.3 - 47.1 %    MCV 85.0 82.6 - 102.9 fL    MCH 28.5 25.2 - 33.5 pg    MCHC 33.5 25.2 - 33.5 g/dL    RDW 13.2 11.8 - 14.4 %    Platelets 748 303 - 609 k/uL    MPV 9.1 8.1 - 13.5 fL    NRBC Automated 0.0 0.0 per 100 WBC    Differential Type NOT REPORTED     Seg Neutrophils 81 (H) 36 - 65 %    Lymphocytes 10 (L) 24 - 43 %    Monocytes 8 3 - 12 %    Eosinophils % 0 (L) 1 - 4 %    Basophils 0 0 - 2 %    Immature Granulocytes 1 (H) 0 %    Segs Absolute 7.89 1.50 - 8.10 k/uL    Absolute Lymph # 1.01 (L) 1.10 - 3.70 k/uL    Absolute Mono # 0.81 0.10 - 1.20 k/uL    Absolute Eos # 0.03 0.00 - 0.44 k/uL    Basophils Absolute <0.03 0.00 - 0.20 k/uL    Absolute Immature Granulocyte 0.05 0.00 - 0.30 k/uL    WBC Morphology NOT REPORTED     RBC Morphology NOT REPORTED     Platelet Estimate NOT REPORTED    Troponin   Result Value Ref Range    Troponin, High Sensitivity <6 0 - 14 ng/L    Troponin T NOT REPORTED <0.03 ng/mL    Troponin Interp NOT REPORTED    D-Dimer, Quantitative   Result Value Ref Range    D-Dimer, Quant 10.39 (H) 0.00 - 0.59 mg/L FEU   HCG Qualitative, Serum   Result Value Ref Range    hCG Qual NEGATIVE NEGATIVE   APTT   Result Value Ref Range    PTT 28.6 23.9 - 33.8 sec   APTT   Result Value Ref Range    .2 (HH) 23.9 - 33.8 sec   Lactate, Sepsis   Result Value Ref Range    Lactic Acid, Sepsis 0.9 0.5 - 1.9 mmol/L    Lactic Acid, Sepsis, Whole Blood NOT REPORTED 0.5 - 1.9 mmol/L   TSH WITH REFLEX   Result Value Ref Range    TSH 14.87 (H) 0.30 - 5.00 mIU/L   CBC Auto Differential   Result Value Ref Range    WBC 10.5 3.5 - 11.3 k/uL    RBC 3.90 (L) 3.95 - 5.11 m/uL    Hemoglobin 11.1 (L) 11.9 - 15.1 g/dL    Hematocrit 33.1 (L) 36.3 - 47.1 %    MCV 84.9 82.6 - 102.9 fL    MCH 28.5 25.2 - 33.5 pg    MCHC 33.5 25.2 - 33.5 g/dL    RDW 13.3 11.8 - 14.4 %    Platelets 257 110 - 011 k/uL    MPV 9.2 8.1 - 13.5 fL    NRBC Automated 0.0 0.0 per 100 WBC    Differential Type NOT REPORTED     Seg Neutrophils 76 (H) 36 - 65 %    Lymphocytes 15 (L) 24 - 43 %    Monocytes 9 3 - 12 %    Eosinophils % 0 (L) 1 - 4 %    Basophils 0 0 - 2 %    Immature Granulocytes 0 0 %    Segs Absolute 7.97 1.50 - 8.10 k/uL    Absolute Lymph # 1.53 1.10 - 3.70 k/uL    Absolute Mono # 0.90 0.10 - 1.20 k/uL    Absolute Eos # 0.03 0.00 - 0.44 k/uL    Basophils Absolute 0.03 0.00 - 0.20 k/uL    Absolute Immature Granulocyte 0.04 0.00 - 0.30 k/uL    WBC Morphology NOT REPORTED     RBC Morphology NOT REPORTED     Platelet Estimate NOT REPORTED    Comprehensive Metabolic Panel w/ Reflex to MG   Result Value Ref Range    Glucose 114 (H) 70 - 99 mg/dL    BUN 4 (L) 6 - 20 mg/dL    CREATININE 0.46 (L) 0.50 - 0.90 mg/dL    Bun/Cre Ratio 9 9 - 20    Calcium 8.3 (L) 8.6 - 10.4 mg/dL    Sodium 136 135 - 144 mmol/L    Potassium 3.5 (L) 3.7 - 5.3 mmol/L    Chloride 102 98 - 107 mmol/L    CO2 21 20 - 31 mmol/L    Anion Gap 13 9 - 17 mmol/L    Alkaline Phosphatase 58 35 - 104 U/L    ALT 12 5 - 33 U/L    AST 14 <32 U/L    Total Bilirubin 0.88 0.3 - 1.2 mg/dL    Total Protein 6.8 6.4 - 8.3 g/dL    Albumin 3.4 (L) 3.5 - 5.2 g/dL    Albumin/Globulin Ratio 1.0 1.0 - 2.5    GFR Non-African American >60 >60 mL/min    GFR African American >60 >60 mL/min    GFR Comment          GFR Staging NOT REPORTED    Lactate Dehydrogenase   Result Value Ref Range     (H) 135 - 214 U/L   D-Dimer, Quantitative   Result Value Ref Range    D-Dimer, Quant 4.98 (H) 0.00 - 0.59 mg/L FEU   Magnesium   Result Value Ref Range    Magnesium 1.7 1.6 - 2.6 mg/dL   EKG 12 Lead   Result Value Ref Range    Ventricular Rate 117 BPM    Atrial Rate 117 BPM    P-R Interval 114 ms    QRS Duration 72 ms    Q-T Interval 318 ms    QTc Calculation (Bazett) 443 ms    P Axis 63 degrees    R Axis 81 degrees    T Axis 70 degrees       RECENT VITALS:  BP: (!) 125/91, Temp: 99.2 °F (37.3 °C), Pulse: 106, Resp: 14     ED Course     The patient was given the following medications:  Orders Placed This Encounter   Medications    0.9 % sodium chloride bolus    iopamidol (ISOVUE-370) 76 % injection 100 mL    heparin (porcine) injection 5,590 Units    heparin (porcine) injection 5,590 Units    heparin (porcine) injection 2,800 Units    heparin 25,000 units in dextrose 5% 250 mL (premix) infusion    levoFLOXacin (LEVAQUIN) 500 MG/100ML infusion 500 mg     Order Specific Question:   Antimicrobial Indications     Answer:   Pneumonia (CAP)    sodium chloride flush 0.9 % injection 5-40 mL    sodium chloride flush 0.9 % injection 5-40 mL    0.9 % sodium chloride infusion    OR Linked Order Group     ondansetron (ZOFRAN-ODT) disintegrating tablet 4 mg     ondansetron (ZOFRAN) injection 4 mg    polyethylene glycol (GLYCOLAX) packet 17 g    OR Linked Order Group     acetaminophen (TYLENOL) tablet 650 mg     acetaminophen (TYLENOL) suppository 650 mg    guaiFENesin-dextromethorphan (ROBITUSSIN DM) 100-10 MG/5ML syrup 5 mL    famotidine (PEPCID) tablet 20 mg    potassium chloride (KLOR-CON M) extended release tablet 20 mEq    albuterol (PROVENTIL) nebulizer solution 2.5 mg     Order Specific Question:   Initiate RT Bronchodilator Protocol     Answer: Yes    sodium chloride nebulizer 0.9 % solution 3 mL    albuterol (PROVENTIL) nebulizer solution 2.5 mg     Order Specific Question:   Initiate RT Bronchodilator Protocol     Answer: Yes    benzonatate (TESSALON) capsule 200 mg    thyroid (ARMOUR) tablet 30 mg    LORazepam (ATIVAN) injection 0.5 mg    influenza quadrivalent split vaccine (FLUZONE;FLUARIX;FLULAVAL;AFLURIA) injection 0.5 mL    potassium chloride (KLOR-CON M) extended release tablet 40 mEq       Medical Decision Making      Please set up through your Dr. Jacob Rhodes. Patient is day 17 after Covid infection increased shortness of breath went to urgent care was found to be significantly tachycardic was referred to the emergency department. Had markedly elevated D-dimer. CT PE study shows bilateral lobar and subsegmental pulmonary embolism without any evidence of right heart strain or saddle embolism. Findings were discussed with the patient as well as risks and benefits of antibiotic therapy given infiltrates versus Covid pneumonia versus pulmonary infarction. Patient was agreeable for antibiotics blood cultures lactic acid were ordered prior to antibiotic administration.   Case was discussed with Arias Griffith CNP in the hospital service was kind enough to admit the patient. CRITICAL CARE: There was a high probability of clinically significant/life threatening deterioration in this patient's condition which required my urgent intervention. Total critical care time was 15 minutes. This excludes any time for separately reportable procedures. Disposition     FINAL IMPRESSION      1. Bilateral pulmonary embolism ([address]    2. COVID-19    3. Tachycardia          DISPOSITION/PLAN   DISPOSITION Admitted 11/10/2021 11:27:47 PM      PATIENT REFERRED TO:  No follow-up provider specified.     DISCHARGE MEDICATIONS:  Current Discharge Medication List                (Please note that portions of this note were completed with a voice recognition program.  Efforts were made to edit the dictations but occasionally words are mis-transcribed.)    Belén Snow MD, [facility]  Attending Emergency Medicine Physician                  Belén Snow MD  11/11/21 [id number]

## 2021-11-11 NOTE — FLOWSHEET NOTE
Case Management Initial Discharge Plan  Phoebe Felipe             Met with:patient to discuss discharge plans. Information verified: address, contacts, phone number, , and insurance: Yes  Insurance Provider: Primary:  Payor: Pattie Lima / Plan: [address] [place] [address] / Product Type: *No Product type* /                                         Emergency Contact/Next of Kin name & number: verified  Who are involved in patient's support system?     PCP: No primary care provider on file. Date of last visit:     Discharge Planning    Living Arrangements:  Spouse/Significant Other, Children     Home has 1 stories  Location of bedroom/bathroom in home: first floor    Patient able to perform ADL's:Independent    Current Services (outpatient & in home) none    Is patient receiving oral anticoagulation therapy? No      Potential Assistance Needed:  N/A    Patient agreeable to home care: n/a  Freedom of choice provided:  n/a    Prior SNF/Rehab Placement and Facility: no  Agreeable to SNF/Rehab: n/a  [place] of choice provided: n/a      Expected Discharge date:  21    Patient expects to be discharged to: If home: is the family and/or caregiver wiling & able to provide support at home? yes  Who will be providing this support?     Follow Up Appointment: Best Day/ Time: Monday AM    Transportation provider:   Transportation arrangements needed for discharge: No    Readmission Risk              Risk of Unplanned Readmission:  14             Does patient have a readmission risk score greater than 20?: No  If yes, follow-up appointment must be made within 7 days of discharge.      Goals of Care:       Educated patient on transitional options and is agreeable with plan      Discharge Plan: home without needs          Electronically signed by Monie Lewis RN on 21 at 1:46 PM EST

## 2021-11-11 NOTE — PROGRESS NOTES
DISCHARGE BARRIERS       Reason for Referral:  SW completed a Psychosocial Assessment for evaluation of patient's mental health, social status, and functional capacity within the community. Letitia Nieves is a 51year old female admitted due to Pulmonary embolism, bilateral. SW provided supportive listening while patient discussed past medical history and COVID-19. Patient reports her family is recovering and doing well. Mental Status:  Alert, oriented, and engaging during assessment. Decision Making:  Makes own decisions. Family/Social/Home Environment: Lives with her spouse and 12year old son in [address] [place] [address]. Support: Discussed a good social support network  Current Services: None  Current DMEs: None    PCP: No PCP on file and repots no issues affording medication.  status:  None   ADLs and means of transportation: Independent in ADLs and able to transport herself. Food insecurity or needed financial assistance: Patient denies any food insecurity or financial concerns at this time. ACP and Code Status: Patient is a full code status and does not have an Advance Directive. SW discussed an Advance Directive which included the patient's choices for care and treatment in the case of a health event that adversely affects decision-making abilities. SW provided education and resources. Patient has no additional questions at this time and has agreed to contact SW should anything change. Collaborative List of SNF/ECF/HH were provided: Declined. No discharge order at this time. Anticipated Needs/Discharge Plan:  Spoke with patient about discharge plan. Patient verbalizes understanding of the plan of care and denies discharge needs or further services at this time. SW provided business card. SW will continue to monitor needs and assist as appropriate.         Electronically signed by DENNIS Minor on 11/11/2021 at 2:11 PM

## 2021-11-12 NOTE — DISCHARGE SUMMARY
[address]                 [address] [place] [id number]                               DISCHARGE SUMMARY    PATIENT NAME: Janey Almendarez                  :        1975  MED REC NO:   [id number]                             ROOM:       [id number]  ACCOUNT NO:   [de-identified]                           ADMIT DATE: 11/10/2021  PROVIDER:     Enma Manuel. Stacy Arambula MD                  DISCHARGE DATE:  2021    ATTENDING PHYSICIAN OF HOSPITALIZATION:  Jim Bateman MD    PERSONAL PHYSICIAN:  Edwin Gomez DO, [facility]. DIAGNOSES:  1.  Bilateral pulmonary emboli, 11/10/2021.  2.  COVID-19 pneumonia, 11/10/2021. CTA chest/pulmonary, 11/10/2021,  acute bilateral PE, no heart strain, multifocal ground-glass confluent  opacities, possible pulmonary infarctions. The patient was COVID-19  positive on 10.26.2021, symptom onset 10/24/2021, unvaccinated, received  Regeneron 2021. 3.  Hypokalemia, replacement therapy given. 4.  Tachycardia, chronic, for which beta-blockers have been recommended. 5.  Hypothyroidism, noncompliant with her medications. 6.  GERD. 7.  Chronic kidney disease, stage II. 8.  Generalized anxiety disorder (EUGENIA). 9.  Tobacco abuse, quit in . The patient underwent a 2D echo on  2021. Normal left ventricular systolic function, normal right  ventricular systolic function, normal AR at 3.1 cm, normal IVC diameter  and collapse, cannot evaluate diastolic dysfunction, LVEF 60%. Doppler  ultrasound bilateral lower extremities, 2021, no DVT. HISTORY OF PRESENT ILLNESS AND HOSPITAL COURSE:  The patient is a  71-year-old white female, previous COVID positivity, initial event of  10/24/2021, unvaccinated, took Regeneron 2021, unfortunately with  a sequela of COVID-19, namely bilateral pulmonary emboli. The patient  was placed on continuous heparin infusion, subsequently has been  converted to Xarelto.       tachycardia, has been seen by  Cardiology, recommended beta-blocker. Hypothyroidism with an elevated  TSH of 14.87 for which it is recommended that she continue to take  Aurora Thyroid 30 mg daily. The patient has this in her possession. The patient previously has had Synthroid listed as an allergy, this is  not the case. Regeneron monoclonal antibodies, a rash  following that, now cleared. Generalized anxiety disorder, controlled. LABORATORY DATA:  Around the time of discharge, white cell count 10.5,  hemoglobin 11.1, hematocrit 33.1, platelets 195,843. Sodium 136,  potassium 3.5 to 3.6 with additional potassium given, chloride 102, CO2  21, BUN 4, creatinine is 0.46, glucose 114, calcium is 8.3, GFR greater  than 60, , TSH 14.87.  D-dimer initially was 10.39, now 4.98.    DISCHARGE INSTRUCTIONS/FOLLOWUP:  Discharged to home on 11/11/2021. DIET:  Cardiac. ACTIVITY:  As tolerated. CONDITION AT DISCHARGE:  Fair, improved. The patient to avoid hazardous  activities given anticoagulation. MEDICATIONS, NEW:  Benzonatate (Tessalon) 200 mg three times a day, take  with food; levofloxacin (Levaquin) 750 mg p.o. daily 6 days; Toprol XL  (metoprolol succinate) 25 mg p.o. daily; Xarelto 15 mg p.o. b.i.d. 21  days and then 20 mg daily thereafter; probiotic acidophilus one three  times a day. FOLLOWING MEDICATIONS FOR WHICH CHANGES HAVE OCCURRED:  Aurora Thyroid  30 mg p.o. q.p.m. The patient had previously had levothyroxine 112 mcg  available to her, no duplication, hence the utilization of the Aurora  Thyroid, give consideration to Synthroid at the time of future change. Follow up with the patient's personal physician, Sreafin Ybarra DO, [facility]. Vaccination for COVID-19 together with flu vaccine highly recommended. Any aspect of the patient's care not discussed in the chart and/or  dictation will be addressed and treated as an outpatient.     The patient's medications have been

## 2021-11-15 ENCOUNTER — FOLLOWUP TELEPHONE ENCOUNTER (OUTPATIENT)
Dept: INPATIENT UNIT | Age: 46
End: 2021-11-15

## 2021-11-17 ENCOUNTER — OFFICE VISIT (OUTPATIENT)
Dept: CARDIOLOGY | Age: 46
End: 2021-11-17
Payer: COMMERCIAL

## 2021-11-17 VITALS
HEART RATE: 114 BPM | WEIGHT: 165 LBS | BODY MASS INDEX: 30.36 KG/M2 | SYSTOLIC BLOOD PRESSURE: 120 MMHG | HEIGHT: 62 IN | DIASTOLIC BLOOD PRESSURE: 60 MMHG

## 2021-11-17 DIAGNOSIS — Z86.711 HISTORY OF PULMONARY EMBOLUS (PE): ICD-10-CM

## 2021-11-17 DIAGNOSIS — R00.0 TACHYCARDIA: Primary | ICD-10-CM

## 2021-11-17 DIAGNOSIS — U07.1 PNEUMONIA DUE TO COVID-19 VIRUS: ICD-10-CM

## 2021-11-17 DIAGNOSIS — J12.82 PNEUMONIA DUE TO COVID-19 VIRUS: ICD-10-CM

## 2021-11-17 PROCEDURE — 93000 ELECTROCARDIOGRAM COMPLETE: CPT | Performed by: INTERNAL MEDICINE

## 2021-11-17 PROCEDURE — 99214 OFFICE O/P EST MOD 30 MIN: CPT | Performed by: INTERNAL MEDICINE

## 2021-11-17 RX ORDER — METOPROLOL SUCCINATE 25 MG/1
37.5 TABLET, EXTENDED RELEASE ORAL DAILY
Qty: 180 TABLET | Refills: 3 | Status: SHIPPED | OUTPATIENT
Start: 2021-11-17

## 2021-11-17 NOTE — PROGRESS NOTES
Other, and Penicillins    Social History:   reports that she quit smoking about 19 years ago. Her smoking use included cigarettes. She started smoking about 31 years ago. She has a 12.00 pack-year smoking history. She has never used smokeless tobacco. She reports that she does not drink alcohol and does not use drugs. Review of Systems:  · Constitutional: there has been no unanticipated weight loss. There's been No change in energy level, No change in activity level. · Eyes: No visual changes or diplopia. No scleral icterus. · ENT: No Headaches, hearing loss or vertigo. No mouth sores or sore throat. · Cardiovascular: As above. · Respiratory: No SOB, cough or hemoptysis. · Gastrointestinal: No abdominal pain, appetite loss, blood in stools. No change in bowel or bladder habits. · Genitourinary: No dysuria, trouble voiding, or hematuria. · Musculoskeletal:  No gait disturbance, No weakness or joint complaints. · Integumentary: No rash or pruritis. · Psychiatric: No anxiety, or depression. · Hematologic/Lymphatic: No abnormal bruising or bleeding, blood clots or swollen lymph nodes. · Allergic/Immunologic: No nasal congestion or hives. Physical Exam:  Ht 5' 2\" (1.575 m)   Wt 165 lb (74.8 kg)   LMP 10/31/2021   BMI 30.18 kg/m²     Constitutional and General Appearance: alert, cooperative, no distress and appears stated age  [de-identified]: PERRL, no cervical lymphadenopathy. No masses palpable. Normal oral mucosa  Respiratory:  · Normal excursion and expansion without use of accessory muscles  · Resp Auscultation: Good respiratory effort. No for increased work of breathing.  On auscultation: clear to auscultation bilaterally  Cardiovascular:  · The apical impulse is not displaced  · Heart tones are crisp and normal. regular S1 and S2.  · Jugular venous pulsation Normal  · The carotid upstroke is normal in amplitude and contour without delay or bruit  · Peripheral pulses are symmetrical and full   Abdomen: · No masses or tenderness  · Bowel sounds present  Extremities:  ·  No Cyanosis or Clubbing  ·  Lower extremity edema: No  ·  Skin: Warm and dry    Cardiac Data:  EKG: Sinus tachycardia  Echo 11/11/21    Summary  Normal LV size and systolic function. Estimated ejection fraction is 60%. No significant valvular regurgitation or stenosis seen. No evidence of pericardial effusion.     CTA 11/10/21 chest yesterday showed acute bilateral PE  Labs:     CBC: No results for input(s): WBC, HGB, HCT, PLT in the last 72 hours. BMP: No results for input(s): NA, K, CO2, BUN, CREATININE, LABGLOM, GLUCOSE in the last 72 hours. PT/INR: No results for input(s): PROTIME, INR in the last 72 hours. FASTING LIPID PANEL:No results found for: CHOL, HDL, LDLDIRECT, LDLCHOLESTEROL, TRIG, LABVLDL, CHOLHDLRATIO  LIVER PROFILE:No results for input(s): AST, ALT, LABALBU in the last 72 hours.       IMPRESSION:    Patient Active Problem List   Diagnosis    COVID-19    Gastroesophageal reflux disease    Generalized anxiety disorder    High risk for colon cancer    Bilateral pulmonary embolism (HCC)    Tachycardia    Hypokalemia       RECOMMENDATIONS:  Sinus tachycardia, multifactorial recent Covid pneumonia, bilateral PE, hypoxia  Will increase metoprolol succinate to 37.5 daily as patient worked at the nighttime advised to take it when coming home before going to sleep  Continue watch blood pressure and heart rate  Bilateral PE on Xarelto watch for any bleeding continue with pulmonary, watch for any bleeding cuts head injury  We will see her back in 3 months  Any change go to the ER        Mino Pugh MD, MD  [facility] Cardiology Consult           [phone]

## 2021-11-25 ENCOUNTER — HOSPITAL ENCOUNTER (EMERGENCY)
Age: 46
Discharge: HOME | End: 2021-11-25
Attending: EMERGENCY MEDICINE
Payer: COMMERCIAL

## 2021-11-25 ENCOUNTER — APPOINTMENT (OUTPATIENT)
Dept: GENERAL RADIOLOGY | Age: 46
End: 2021-11-25
Payer: COMMERCIAL

## 2021-11-25 ENCOUNTER — TELEPHONE (OUTPATIENT)
Dept: OTHER | Facility: CLINIC | Age: 46
End: 2021-11-25

## 2021-11-25 VITALS
BODY MASS INDEX: 29.44 KG/M2 | WEIGHT: 160 LBS | OXYGEN SATURATION: 99 % | HEIGHT: 62 IN | TEMPERATURE: 98.6 F | SYSTOLIC BLOOD PRESSURE: 124 MMHG | DIASTOLIC BLOOD PRESSURE: 88 MMHG | RESPIRATION RATE: 16 BRPM | HEART RATE: 100 BPM

## 2021-11-25 DIAGNOSIS — U07.1 PNEUMONIA DUE TO COVID-19 VIRUS: ICD-10-CM

## 2021-11-25 DIAGNOSIS — R09.1 PLEURISY: Primary | ICD-10-CM

## 2021-11-25 DIAGNOSIS — J12.82 PNEUMONIA DUE TO COVID-19 VIRUS: ICD-10-CM

## 2021-11-25 DIAGNOSIS — I26.99 PULMONARY EMBOLISM WITHOUT ACUTE COR PULMONALE, UNSPECIFIED CHRONICITY, UNSPECIFIED PULMONARY EMBOLISM TYPE (HCC): ICD-10-CM

## 2021-11-25 LAB
-: ABNORMAL
ABSOLUTE EOS #: 0.17 K/UL (ref 0–0.44)
ABSOLUTE IMMATURE GRANULOCYTE: <0.03 K/UL (ref 0–0.3)
ABSOLUTE LYMPH #: 2.18 K/UL (ref 1.1–3.7)
ABSOLUTE MONO #: 0.53 K/UL (ref 0.1–1.2)
ALBUMIN SERPL-MCNC: 3.9 G/DL (ref 3.5–5.2)
ALBUMIN/GLOBULIN RATIO: 1.1 (ref 1–2.5)
ALP BLD-CCNC: 68 U/L (ref 35–104)
ALT SERPL-CCNC: 10 U/L (ref 5–33)
AMORPHOUS: ABNORMAL
ANION GAP SERPL CALCULATED.3IONS-SCNC: 14 MMOL/L (ref 9–17)
AST SERPL-CCNC: 19 U/L
BACTERIA: ABNORMAL
BASOPHILS # BLD: 1 % (ref 0–2)
BASOPHILS ABSOLUTE: 0.04 K/UL (ref 0–0.2)
BILIRUB SERPL-MCNC: 0.41 MG/DL (ref 0.3–1.2)
BILIRUBIN URINE: NEGATIVE
BUN BLDV-MCNC: 10 MG/DL (ref 6–20)
BUN/CREAT BLD: 20 (ref 9–20)
CALCIUM SERPL-MCNC: 8.8 MG/DL (ref 8.6–10.4)
CASTS UA: ABNORMAL /LPF (ref 0–2)
CHLORIDE BLD-SCNC: 100 MMOL/L (ref 98–107)
CO2: 22 MMOL/L (ref 20–31)
COLOR: ABNORMAL
COMMENT UA: ABNORMAL
CREAT SERPL-MCNC: 0.5 MG/DL (ref 0.5–0.9)
CRYSTALS, UA: ABNORMAL /HPF
DIFFERENTIAL TYPE: NORMAL
EOSINOPHILS RELATIVE PERCENT: 3 % (ref 1–4)
EPITHELIAL CELLS UA: >50 /HPF (ref 0–5)
GFR AFRICAN AMERICAN: >60 ML/MIN
GFR NON-AFRICAN AMERICAN: >60 ML/MIN
GFR SERPL CREATININE-BSD FRML MDRD: ABNORMAL ML/MIN/{1.73_M2}
GFR SERPL CREATININE-BSD FRML MDRD: ABNORMAL ML/MIN/{1.73_M2}
GLUCOSE BLD-MCNC: 98 MG/DL (ref 70–99)
GLUCOSE URINE: NEGATIVE
HCT VFR BLD CALC: 38.1 % (ref 36.3–47.1)
HEMOGLOBIN: 12.1 G/DL (ref 11.9–15.1)
IMMATURE GRANULOCYTES: 0 %
KETONES, URINE: NEGATIVE
LEUKOCYTE ESTERASE, URINE: ABNORMAL
LYMPHOCYTES # BLD: 33 % (ref 24–43)
MCH RBC QN AUTO: 28.3 PG (ref 25.2–33.5)
MCHC RBC AUTO-ENTMCNC: 31.8 G/DL (ref 25.2–33.5)
MCV RBC AUTO: 89 FL (ref 82.6–102.9)
MONOCYTES # BLD: 8 % (ref 3–12)
MUCUS: ABNORMAL
NITRITE, URINE: NEGATIVE
NRBC AUTOMATED: 0 PER 100 WBC
OTHER OBSERVATIONS UA: ABNORMAL
PDW BLD-RTO: 14.3 % (ref 11.8–14.4)
PH UA: 5.5 (ref 5–6)
PLATELET # BLD: 392 K/UL (ref 138–453)
PLATELET ESTIMATE: NORMAL
PMV BLD AUTO: 9 FL (ref 8.1–13.5)
POTASSIUM SERPL-SCNC: 3.4 MMOL/L (ref 3.7–5.3)
PROTEIN UA: NEGATIVE
RBC # BLD: 4.28 M/UL (ref 3.95–5.11)
RBC # BLD: NORMAL 10*6/UL
RBC UA: ABNORMAL /HPF (ref 0–4)
RENAL EPITHELIAL, UA: ABNORMAL /HPF
SEG NEUTROPHILS: 55 % (ref 36–65)
SEGMENTED NEUTROPHILS ABSOLUTE COUNT: 3.67 K/UL (ref 1.5–8.1)
SODIUM BLD-SCNC: 136 MMOL/L (ref 135–144)
SPECIFIC GRAVITY UA: 1.03 (ref 1.01–1.02)
TOTAL PROTEIN: 7.5 G/DL (ref 6.4–8.3)
TRICHOMONAS: ABNORMAL
TROPONIN INTERP: NORMAL
TROPONIN T: NORMAL NG/ML
TROPONIN, HIGH SENSITIVITY: 6 NG/L (ref 0–14)
TURBIDITY: ABNORMAL
URINE HGB: NEGATIVE
UROBILINOGEN, URINE: NORMAL
WBC # BLD: 6.6 K/UL (ref 3.5–11.3)
WBC # BLD: NORMAL 10*3/UL
WBC UA: ABNORMAL /HPF (ref 0–4)
YEAST: ABNORMAL

## 2021-11-25 PROCEDURE — 71045 X-RAY EXAM CHEST 1 VIEW: CPT

## 2021-11-25 PROCEDURE — 84484 ASSAY OF TROPONIN QUANT: CPT

## 2021-11-25 PROCEDURE — 6370000000 HC RX 637 (ALT 250 FOR IP): Performed by: EMERGENCY MEDICINE

## 2021-11-25 PROCEDURE — 85025 COMPLETE CBC W/AUTO DIFF WBC: CPT

## 2021-11-25 PROCEDURE — 93005 ELECTROCARDIOGRAM TRACING: CPT | Performed by: EMERGENCY MEDICINE

## 2021-11-25 PROCEDURE — 99283 EMERGENCY DEPT VISIT LOW MDM: CPT

## 2021-11-25 PROCEDURE — 81001 URINALYSIS AUTO W/SCOPE: CPT

## 2021-11-25 PROCEDURE — 80053 COMPREHEN METABOLIC PANEL: CPT

## 2021-11-25 RX ORDER — PREDNISONE 10 MG/1
TABLET ORAL
Qty: 20 TABLET | Refills: 0 | Status: SHIPPED | OUTPATIENT
Start: 2021-11-25 | End: 2021-12-03

## 2021-11-25 RX ORDER — HYDROCODONE BITARTRATE AND ACETAMINOPHEN 5; 325 MG/1; MG/1
1 TABLET ORAL EVERY 6 HOURS PRN
Qty: 20 TABLET | Refills: 0 | Status: SHIPPED | OUTPATIENT
Start: 2021-11-25 | End: 2021-11-28

## 2021-11-25 RX ORDER — PREDNISONE 20 MG/1
40 TABLET ORAL ONCE
Status: COMPLETED | OUTPATIENT
Start: 2021-11-25 | End: 2021-11-25

## 2021-11-25 RX ADMIN — PREDNISONE 40 MG: 20 TABLET ORAL at 14:29

## 2021-11-25 ASSESSMENT — ENCOUNTER SYMPTOMS
VOMITING: 0
DIARRHEA: 0
EYE PAIN: 0
SHORTNESS OF BREATH: 0
BACK PAIN: 0
COUGH: 1
BLOOD IN STOOL: 0
NAUSEA: 0
ABDOMINAL PAIN: 0

## 2021-11-25 ASSESSMENT — PAIN SCALES - GENERAL: PAINLEVEL_OUTOF10: 8

## 2021-11-25 ASSESSMENT — PAIN DESCRIPTION - DESCRIPTORS: DESCRIPTORS: SHARP

## 2021-11-25 ASSESSMENT — PAIN DESCRIPTION - LOCATION: LOCATION: FLANK

## 2021-11-25 ASSESSMENT — PAIN DESCRIPTION - PAIN TYPE: TYPE: ACUTE PAIN

## 2021-11-25 ASSESSMENT — PAIN DESCRIPTION - ORIENTATION: ORIENTATION: RIGHT

## 2021-11-25 NOTE — ED PROVIDER NOTES
[facility]  eMERGENCY dEPARTMENT eNCOUnter      Pt Name: Carrie Zhang  MRN: [id number]  [place] 1975  Date of evaluation: 11/25/2021      CHIEF COMPLAINT       Chief Complaint   Patient presents with    Flank Pain     R side started this am.          HISTORY OF PRESENT ILLNESS    Carrie Zhang is a 51 y.o. female who presents with a chief complaint of right-sided flank pain that started this morning. Patient has a history of Covid she received Regeneron on the third of this month she was diagnosed with a PE on the 10th of this month she been placed on Xarelto she said she completed the antibiotic she is on the Xarelto she had some right-sided pleuritic pain but that seemed to go away and restarted again today she is been taking Tylenol that has not helped no fevers or chills she has a very slight cough no other chest pain no swelling of the legs      REVIEW OF SYSTEMS         Review of Systems   Constitutional: Negative for chills and fever. HENT: Negative for congestion and ear pain. Eyes: Negative for pain and visual disturbance. Respiratory: Positive for cough. Negative for shortness of breath. Cardiovascular: Positive for chest pain. Negative for palpitations and leg swelling. Gastrointestinal: Negative for abdominal pain, blood in stool, diarrhea, nausea and vomiting. Endocrine: Negative for polydipsia and polyuria. Genitourinary: Positive for flank pain. Negative for difficulty urinating, dysuria and frequency. Musculoskeletal: Negative for back pain, joint swelling, myalgias, neck pain and neck stiffness. Skin: Negative for rash. Neurological: Negative for dizziness, weakness and headaches. Hematological: Negative for adenopathy. Does not bruise/bleed easily. Psychiatric/Behavioral: Negative for confusion, self-injury and suicidal ideas. PAST MEDICAL HISTORY    has a past medical history of Goiter, Hypothyroidism, and Tachycardia.     SURGICAL HISTORY      has a past surgical history that includes  section (2005) and Colonoscopy (2007). CURRENT MEDICATIONS       Previous Medications    METOPROLOL SUCCINATE (TOPROL XL) 25 MG EXTENDED RELEASE TABLET    Take 1.5 tablets by mouth daily Take one and half tablet daily    RIVAROXABAN (XARELTO) 15 MG TABS TABLET    Take 1 tablet by mouth daily (with breakfast) for 21 days    RIVAROXABAN (XARELTO) 20 MG TABS TABLET    Take 1 tablet by mouth daily (with breakfast)    THYROID (ARMOUR) 30 MG TABLET    Take 1 tablet by mouth every morning (before breakfast)       ALLERGIES     is allergic to codeine, monoclonal antibodies, other, and penicillins. FAMILY HISTORY     She indicated that the status of her mother is unknown. She indicated that the status of her father is unknown. She indicated that the status of her brother is unknown.     family history includes Colon Cancer in her mother; Emphysema in her father; High Blood Pressure in her brother and sister; Other in her mother, sister, and sister; Thyroid Disease in her mother. SOCIAL HISTORY      reports that she quit smoking about 19 years ago. Her smoking use included cigarettes. She started smoking about 31 years ago. She has a 12.00 pack-year smoking history. She has never used smokeless tobacco. She reports that she does not drink alcohol and does not use drugs. PHYSICAL EXAM     INITIAL VITALS:  height is 5' 2\" (1.575 m) and weight is 160 lb (72.6 kg). Her blood pressure is 141/84 (abnormal) and her pulse is 111. Her respiration is 16 and oxygen saturation is 98%. Physical Exam  Constitutional:       Appearance: Normal appearance. She is well-developed. HENT:      Head: Normocephalic and atraumatic. Eyes:      Conjunctiva/sclera: Conjunctivae normal.      Pupils: Pupils are equal, round, and reactive to light. Cardiovascular:      Rate and Rhythm: Normal rate and regular rhythm.    Pulmonary:      Effort: Pulmonary effort is normal. No respiratory distress. Breath sounds: Normal breath sounds. No wheezing or rales. Abdominal:      General: Bowel sounds are normal.      Palpations: Abdomen is soft. Musculoskeletal:         General: No tenderness. Normal range of motion. Cervical back: Normal range of motion. Skin:     General: Skin is warm and dry. Neurological:      General: No focal deficit present. Mental Status: She is alert and oriented to person, place, and time. Psychiatric:         Behavior: Behavior normal.           DIFFERENTIAL DIAGNOSIS/ MDM:     Sided pleuritic pain and flank pain will do a work-up    DIAGNOSTIC RESULTS     EKG: All EKG's are interpreted by the Emergency Department Physician who either signs or Co-signs this chart in the absence of a cardiologist.  Normal sinus rhythm rate of 90 bpm NV was 128 ms QRS duration 74 ms QT corrected 420 ms axis is 49 is no acute ST or T wave changes seen      RADIOLOGY:   I directly visualized the following  images and reviewed the radiologist interpretations:       ONE XRAY VIEW OF THE CHEST       11/25/2021 1:00 pm       COMPARISON:   Chest CT 11/10/2021       HISTORY:   ORDERING SYSTEM PROVIDED HISTORY: Right sided pleuritic pain history of Covid   pneumonia history of pulmonary embolism   TECHNOLOGIST PROVIDED HISTORY:   Right sided pleuritic pain history of Covid pneumonia history of pulmonary   embolism   Reason for Exam: Right sided pleuritic pain   Acuity: Acute   Type of Exam: Initial       FINDINGS:   The cardiac mediastinal contours appear within normal limits.   Asymmetric   density in the right lung base is noted and grossly similar compared to   recent chest CT.  Streaky linear and ground-glass opacities in the left lung   base are again noted.  No new airspace disease appreciated.  No pneumothorax   or effusion.  No acute osseous abnormality identified.           Impression   Grossly similar appearance of patchy airspace disease predominantly in the   right lung base, compared to chest CT 11/10/2021.               ED BEDSIDE ULTRASOUND:       LABS:  Labs Reviewed   URINE RT REFLEX TO CULTURE - Abnormal; Notable for the following components:       Result Value    Specific Gravity, UA 1.030 (*)     Leukocyte Esterase, Urine TRACE (*)     All other components within normal limits   COMPREHENSIVE METABOLIC PANEL - Abnormal; Notable for the following components:    Potassium 3.4 (*)     All other components within normal limits   MICROSCOPIC URINALYSIS - Abnormal; Notable for the following components:    Bacteria, UA TRACE (*)     Mucus, UA 2+ (*)     All other components within normal limits   CBC WITH AUTO DIFFERENTIAL   TROPONIN           EMERGENCY DEPARTMENT COURSE:   Vitals:    Vitals:    11/25/21 1232   BP: (!) 141/84   Pulse: 111   Resp: 16   SpO2: 98%   Weight: 160 lb (72.6 kg)   Height: 5' 2\" (1.575 m)     -------------------------  BP: (!) 141/84,  , Pulse: 111, Resp: 16        Re-evaluation Notes    On reevaluation patient is resting comfortably sats are 99% on room air    CRITICAL CARE:   None        CONSULTS:      PROCEDURES:  None    FINAL IMPRESSION      1. Pleurisy    2. Pneumonia due to COVID-19 virus    3. Pulmonary embolism without acute cor pulmonale, unspecified chronicity, unspecified pulmonary embolism type ([facility] [address]          DISPOSITION/PLAN   DISPOSITION discharged    Condition on Disposition    Stable    PATIENT REFERRED TO:  No follow-up provider specified. DISCHARGE MEDICATIONS:  New Prescriptions    HYDROCODONE-ACETAMINOPHEN (NORCO) 5-325 MG PER TABLET    Take 1 tablet by mouth every 6 hours as needed for Pain for up to 3 days.     PREDNISONE (DELTASONE) 10 MG TABLET    Take 4 tablets by mouth once daily for 5 days       (Please note that portions of this note were completed with a voice recognition program.  Efforts were made to edit the dictations but occasionally words are mis-transcribed.)    Thang Hidalgo MD,, MD, F.A.A.E.M.   Attending Emergency Physician                          Yasmeen Del Valle MD  11/25/21 5880

## 2021-11-27 LAB
EKG ATRIAL RATE: 90 BPM
EKG P AXIS: 55 DEGREES
EKG P-R INTERVAL: 128 MS
EKG Q-T INTERVAL: 344 MS
EKG QRS DURATION: 74 MS
EKG QTC CALCULATION (BAZETT): 420 MS
EKG R AXIS: 49 DEGREES
EKG T AXIS: 42 DEGREES
EKG VENTRICULAR RATE: 90 BPM

## 2021-12-03 ENCOUNTER — OFFICE VISIT (OUTPATIENT)
Dept: PRIMARY CARE CLINIC | Age: 46
End: 2021-12-03
Payer: COMMERCIAL

## 2021-12-03 VITALS
WEIGHT: 158 LBS | OXYGEN SATURATION: 98 % | DIASTOLIC BLOOD PRESSURE: 74 MMHG | TEMPERATURE: 98.2 F | SYSTOLIC BLOOD PRESSURE: 120 MMHG | BODY MASS INDEX: 28.9 KG/M2 | HEART RATE: 120 BPM

## 2021-12-03 DIAGNOSIS — R07.89 CHEST WALL PAIN: Primary | ICD-10-CM

## 2021-12-03 DIAGNOSIS — M99.08 RIB CAGE REGION SOMATIC DYSFUNCTION: ICD-10-CM

## 2021-12-03 PROCEDURE — 99213 OFFICE O/P EST LOW 20 MIN: CPT | Performed by: FAMILY MEDICINE

## 2021-12-03 PROCEDURE — 98925 OSTEOPATH MANJ 1-2 REGIONS: CPT | Performed by: FAMILY MEDICINE

## 2021-12-03 RX ORDER — PREDNISONE 20 MG/1
TABLET ORAL
Qty: 10 TABLET | Refills: 0 | Status: SHIPPED | OUTPATIENT
Start: 2021-12-03 | End: 2022-01-28

## 2021-12-03 ASSESSMENT — PATIENT HEALTH QUESTIONNAIRE - PHQ9
SUM OF ALL RESPONSES TO PHQ9 QUESTIONS 1 & 2: 0
SUM OF ALL RESPONSES TO PHQ QUESTIONS 1-9: 0
1. LITTLE INTEREST OR PLEASURE IN DOING THINGS: 0
2. FEELING DOWN, DEPRESSED OR HOPELESS: 0

## 2021-12-03 ASSESSMENT — ENCOUNTER SYMPTOMS
GASTROINTESTINAL NEGATIVE: 1
SHORTNESS OF BREATH: 1
WHEEZING: 0
CHEST TIGHTNESS: 0
EYES NEGATIVE: 1

## 2021-12-09 ENCOUNTER — TELEPHONE (OUTPATIENT)
Dept: CARDIOLOGY | Age: 46
End: 2021-12-09

## 2021-12-09 NOTE — TELEPHONE ENCOUNTER
Increased palpitations, multiple times per day, used to be every 4-5 days. HR is running 70-80. She said she is drinking plenty of water. She has NOT taken the metoprolol 37.5 mg daily per Dr. Saqib Garcia orders from Nov appt. She said some days she has taken this dose. I told her to take as Dr. Teresa Bermudez told her same time every day. She states understanding and agreement. She knows to use ER prn. I moved her appt sooner to next available 1/7/22. Please review and advise.

## 2022-01-04 ENCOUNTER — TELEPHONE (OUTPATIENT)
Dept: CARDIOLOGY | Age: 47
End: 2022-01-04

## 2022-01-04 NOTE — TELEPHONE ENCOUNTER
Pt called and is out of the 20 mg xeralto- Pt is pretty sure she needs to continue this med until her follow up in March (3 Months). If this is correct pt will need to have prescription sent to her pharmacy.  Pt would like to have a call to explain as well.    [phone]    Last Appt:  Visit date not found  Next Appt:   3/9/2022  Med verified in Epic

## 2022-01-05 NOTE — TELEPHONE ENCOUNTER
She is on xarelto for PE. I have signed Rx with refills.  Patient will need to discuss length of treatment with PCP and any further refills will be by PCP

## 2022-01-26 ENCOUNTER — TELEPHONE (OUTPATIENT)
Dept: SURGERY | Age: 47
End: 2022-01-26

## 2022-01-26 NOTE — TELEPHONE ENCOUNTER
Patient is due for screening colonoscopy, no symptoms. Patient requested Dr. Nilson Ponce, colonoscopy paperwork mailed.

## 2022-01-28 RX ORDER — OMEPRAZOLE 40 MG/1
40 CAPSULE, DELAYED RELEASE ORAL DAILY
COMMUNITY
End: 2022-09-16

## 2022-01-28 RX ORDER — ACETAMINOPHEN 500 MG
1000 TABLET ORAL DAILY PRN
COMMUNITY

## 2022-01-28 RX ORDER — FLUTICASONE PROPIONATE 50 MCG
1 SPRAY, SUSPENSION (ML) NASAL DAILY
COMMUNITY
Start: 2021-11-22 | End: 2022-09-16

## 2022-01-28 RX ORDER — CETIRIZINE HYDROCHLORIDE 10 MG/1
10 TABLET ORAL DAILY
COMMUNITY
End: 2022-09-16

## 2022-09-16 ENCOUNTER — OFFICE VISIT (OUTPATIENT)
Dept: FAMILY MEDICINE CLINIC | Age: 47
End: 2022-09-16
Payer: COMMERCIAL

## 2022-09-16 ENCOUNTER — HOSPITAL ENCOUNTER (OUTPATIENT)
Dept: LAB | Age: 47
Discharge: HOME | End: 2022-09-16
Payer: COMMERCIAL

## 2022-09-16 ENCOUNTER — TELEPHONE (OUTPATIENT)
Dept: FAMILY MEDICINE CLINIC | Age: 47
End: 2022-09-16

## 2022-09-16 VITALS
DIASTOLIC BLOOD PRESSURE: 80 MMHG | HEART RATE: 59 BPM | HEIGHT: 63 IN | TEMPERATURE: 97.5 F | WEIGHT: 173.2 LBS | SYSTOLIC BLOOD PRESSURE: 126 MMHG | OXYGEN SATURATION: 99 % | BODY MASS INDEX: 30.69 KG/M2

## 2022-09-16 DIAGNOSIS — I26.99 BILATERAL PULMONARY EMBOLISM (HCC): ICD-10-CM

## 2022-09-16 DIAGNOSIS — E03.9 ACQUIRED HYPOTHYROIDISM: ICD-10-CM

## 2022-09-16 DIAGNOSIS — Z86.2 HISTORY OF ANEMIA: ICD-10-CM

## 2022-09-16 DIAGNOSIS — R73.09 ELEVATED GLUCOSE: ICD-10-CM

## 2022-09-16 DIAGNOSIS — Z87.42 HISTORY OF HEAVY PERIODS: ICD-10-CM

## 2022-09-16 DIAGNOSIS — R10.9 LEFT SIDED ABDOMINAL PAIN: Primary | ICD-10-CM

## 2022-09-16 DIAGNOSIS — Z12.11 COLON CANCER SCREENING: ICD-10-CM

## 2022-09-16 DIAGNOSIS — E66.9 OBESITY (BMI 30.0-34.9): ICD-10-CM

## 2022-09-16 DIAGNOSIS — Z79.01 CHRONIC ANTICOAGULATION: ICD-10-CM

## 2022-09-16 DIAGNOSIS — R00.0 TACHYCARDIA: ICD-10-CM

## 2022-09-16 DIAGNOSIS — Z00.00 HEALTHCARE MAINTENANCE: ICD-10-CM

## 2022-09-16 DIAGNOSIS — Z80.0 FAMILY HISTORY OF COLON CANCER IN MOTHER: ICD-10-CM

## 2022-09-16 DIAGNOSIS — Z12.31 ENCOUNTER FOR SCREENING MAMMOGRAM FOR BREAST CANCER: ICD-10-CM

## 2022-09-16 DIAGNOSIS — E03.9 ACQUIRED HYPOTHYROIDISM: Primary | ICD-10-CM

## 2022-09-16 DIAGNOSIS — R71.0 DECREASED HEMOGLOBIN: ICD-10-CM

## 2022-09-16 LAB
ABSOLUTE EOS #: 0.11 K/UL (ref 0–0.44)
ABSOLUTE IMMATURE GRANULOCYTE: 0 K/UL (ref 0–0.3)
ABSOLUTE LYMPH #: 1.79 K/UL (ref 1.1–3.7)
ABSOLUTE MONO #: 0.39 K/UL (ref 0.1–1.2)
ALBUMIN SERPL-MCNC: 4.3 G/DL (ref 3.5–5.2)
ALBUMIN/GLOBULIN RATIO: 1.2 (ref 1–2.5)
ALP BLD-CCNC: 71 U/L (ref 35–104)
ALT SERPL-CCNC: 9 U/L (ref 5–33)
ANION GAP SERPL CALCULATED.3IONS-SCNC: 10 MMOL/L (ref 9–17)
AST SERPL-CCNC: 17 U/L
BASOPHILS # BLD: 1 % (ref 0–2)
BASOPHILS ABSOLUTE: 0.06 K/UL (ref 0–0.2)
BILIRUB SERPL-MCNC: 0.3 MG/DL (ref 0.3–1.2)
BUN BLDV-MCNC: 10 MG/DL (ref 6–20)
BUN/CREAT BLD: 17 (ref 9–20)
CALCIUM SERPL-MCNC: 8.9 MG/DL (ref 8.6–10.4)
CHLORIDE BLD-SCNC: 105 MMOL/L (ref 98–107)
CHOLESTEROL/HDL RATIO: 4.1
CHOLESTEROL: 112 MG/DL
CO2: 24 MMOL/L (ref 20–31)
CREAT SERPL-MCNC: 0.58 MG/DL (ref 0.5–0.9)
EOSINOPHILS RELATIVE PERCENT: 2 % (ref 1–4)
GFR AFRICAN AMERICAN: >60 ML/MIN
GFR NON-AFRICAN AMERICAN: >60 ML/MIN
GFR SERPL CREATININE-BSD FRML MDRD: NORMAL ML/MIN/{1.73_M2}
GLUCOSE BLD-MCNC: 86 MG/DL (ref 70–99)
HCT VFR BLD CALC: 32 % (ref 36.3–47.1)
HDLC SERPL-MCNC: 27 MG/DL
HEMOGLOBIN: 8.9 G/DL (ref 11.9–15.1)
IMMATURE GRANULOCYTES: 0 %
LDL CHOLESTEROL: 66 MG/DL (ref 0–130)
LYMPHOCYTES # BLD: 32 % (ref 24–43)
MCH RBC QN AUTO: 20 PG (ref 25.2–33.5)
MCHC RBC AUTO-ENTMCNC: 27.8 G/DL (ref 25.2–33.5)
MCV RBC AUTO: 71.9 FL (ref 82.6–102.9)
MONOCYTES # BLD: 7 % (ref 3–12)
MORPHOLOGY: ABNORMAL
NRBC AUTOMATED: 0 PER 100 WBC
PDW BLD-RTO: 19.3 % (ref 11.8–14.4)
PLATELET # BLD: 317 K/UL (ref 138–453)
PMV BLD AUTO: 9.8 FL (ref 8.1–13.5)
POTASSIUM SERPL-SCNC: 4 MMOL/L (ref 3.7–5.3)
RBC # BLD: 4.45 M/UL (ref 3.95–5.11)
SEG NEUTROPHILS: 58 % (ref 36–65)
SEGMENTED NEUTROPHILS ABSOLUTE COUNT: 3.25 K/UL (ref 1.5–8.1)
SODIUM BLD-SCNC: 139 MMOL/L (ref 135–144)
THYROXINE, FREE: 0.78 NG/DL (ref 0.93–1.7)
TOTAL PROTEIN: 7.9 G/DL (ref 6.4–8.3)
TRIGL SERPL-MCNC: 93 MG/DL
TSH SERPL DL<=0.05 MIU/L-ACNC: 13.6 UIU/ML (ref 0.3–5)
VITAMIN D 25-HYDROXY: 13.9 NG/ML
WBC # BLD: 5.6 K/UL (ref 3.5–11.3)

## 2022-09-16 PROCEDURE — 80061 LIPID PANEL: CPT

## 2022-09-16 PROCEDURE — 82306 VITAMIN D 25 HYDROXY: CPT

## 2022-09-16 PROCEDURE — 80053 COMPREHEN METABOLIC PANEL: CPT

## 2022-09-16 PROCEDURE — 85025 COMPLETE CBC W/AUTO DIFF WBC: CPT

## 2022-09-16 PROCEDURE — 36415 COLL VENOUS BLD VENIPUNCTURE: CPT

## 2022-09-16 PROCEDURE — 99215 OFFICE O/P EST HI 40 MIN: CPT | Performed by: FAMILY MEDICINE

## 2022-09-16 PROCEDURE — 84443 ASSAY THYROID STIM HORMONE: CPT

## 2022-09-16 PROCEDURE — 84439 ASSAY OF FREE THYROXINE: CPT

## 2022-09-16 PROCEDURE — 83036 HEMOGLOBIN GLYCOSYLATED A1C: CPT

## 2022-09-16 RX ORDER — LEVOTHYROXINE SODIUM 0.05 MG/1
50 TABLET ORAL DAILY
Qty: 30 TABLET | Refills: 2 | Status: SHIPPED | OUTPATIENT
Start: 2022-09-16

## 2022-09-16 ASSESSMENT — PATIENT HEALTH QUESTIONNAIRE - PHQ9
SUM OF ALL RESPONSES TO PHQ QUESTIONS 1-9: 0
3. TROUBLE FALLING OR STAYING ASLEEP: 0
SUM OF ALL RESPONSES TO PHQ QUESTIONS 1-9: 0
9. THOUGHTS THAT YOU WOULD BE BETTER OFF DEAD, OR OF HURTING YOURSELF: 0
5. POOR APPETITE OR OVEREATING: 0
2. FEELING DOWN, DEPRESSED OR HOPELESS: 0
7. TROUBLE CONCENTRATING ON THINGS, SUCH AS READING THE NEWSPAPER OR WATCHING TELEVISION: 0
6. FEELING BAD ABOUT YOURSELF - OR THAT YOU ARE A FAILURE OR HAVE LET YOURSELF OR YOUR FAMILY DOWN: 0
SUM OF ALL RESPONSES TO PHQ QUESTIONS 1-9: 0
SUM OF ALL RESPONSES TO PHQ QUESTIONS 1-9: 0
SUM OF ALL RESPONSES TO PHQ9 QUESTIONS 1 & 2: 0
1. LITTLE INTEREST OR PLEASURE IN DOING THINGS: 0
8. MOVING OR SPEAKING SO SLOWLY THAT OTHER PEOPLE COULD HAVE NOTICED. OR THE OPPOSITE, BEING SO FIGETY OR RESTLESS THAT YOU HAVE BEEN MOVING AROUND A LOT MORE THAN USUAL: 0
10. IF YOU CHECKED OFF ANY PROBLEMS, HOW DIFFICULT HAVE THESE PROBLEMS MADE IT FOR YOU TO DO YOUR WORK, TAKE CARE OF THINGS AT HOME, OR GET ALONG WITH OTHER PEOPLE: 0
4. FEELING TIRED OR HAVING LITTLE ENERGY: 0

## 2022-09-16 ASSESSMENT — ANXIETY QUESTIONNAIRES
1. FEELING NERVOUS, ANXIOUS, OR ON EDGE: 3
2. NOT BEING ABLE TO STOP OR CONTROL WORRYING: 2
7. FEELING AFRAID AS IF SOMETHING AWFUL MIGHT HAPPEN: 0
5. BEING SO RESTLESS THAT IT IS HARD TO SIT STILL: 2
IF YOU CHECKED OFF ANY PROBLEMS ON THIS QUESTIONNAIRE, HOW DIFFICULT HAVE THESE PROBLEMS MADE IT FOR YOU TO DO YOUR WORK, TAKE CARE OF THINGS AT HOME, OR GET ALONG WITH OTHER PEOPLE: SOMEWHAT DIFFICULT
4. TROUBLE RELAXING: 3
3. WORRYING TOO MUCH ABOUT DIFFERENT THINGS: 0
GAD7 TOTAL SCORE: 13
6. BECOMING EASILY ANNOYED OR IRRITABLE: 3

## 2022-09-16 NOTE — TELEPHONE ENCOUNTER
Please advise the patient that the TSH is elevated. I recommend that she begin Levothyroxine 50 mcg daily. This was e-prescribed to [facility] in [place]. She should repeat a TSH and free T4 in 6-8 weeks. Please order and mail these orders to the patient. Also, the hemoglobin is decreased. I recommend a pelvic ultrasound due to her history of heavy periods. I also recommend stool hemoccult testing. Please order. Thank you. The rest of the lab results are not available yet.

## 2022-09-16 NOTE — PROGRESS NOTES
[address]             [address] [facility], [address] [place] [address] Drive                        Telephone [phone]             Fax [phone]       Phoebe Felipe  :  1975  Age:  51 y.o. MRN:  [id number]  Date of visit:  2022       Assessment and Plan:    1. Left sided abdominal pain  2. Family history of colon cancer in mother  1. Colon cancer screening  I have recommended evaluation by general surgery. A referral was ordered:  - [facility], [place]    4. Acquired hypothyroidism  As noted, she is not taking Levothyroxine currently. Labs were ordered to be done today:  - T4, Free; Future  - TSH; Future    She will be contacted when the results are available. 5. Tachycardia  Her symptoms are currently well controlled. She has not followed up with cardiology as scheduled. A referral to cardiology was ordered:  - Ambulatory referral to Cardiology    6. Elevated glucose  Labs were ordered to be done today:  - Comprehensive Metabolic Panel; Future  - Hemoglobin A1C; Future    7. History of anemia  - CBC with Auto Differential; Future was ordered to be done today. 8. Bilateral pulmonary embolism ([address]  9. Chronic anticoagulation  Xarelto was initiated in 2021. The pulmonary emboli were diagnosed when she had Covid. She was advised that she can discontinue Xarelto as she has completed over 9 months of therapy. 10. Obesity (BMI 30.0-34.9)  - Vitamin D 25 Hydroxy; Future was ordered. She will be contacted when the results are available. 11.  Routine health maintenance  Health maintenance was reviewed with the patient. Covid vaccination was recommended. I discussed with the patient that the risks of the vaccine are less than the risks related to Covid illness. She plans to get an influenza vaccine at work. Lipid panel was recommended and ordered.   She was advised to schedule an appointment for an annual exam and Pap test.   Screening mammogram was recommended and ordered. Hepatitis C and HIV screenings were recommended and declined. Follow up instructions were given to the patient:  Return in about 2 months (around 11/16/2022) for Pap/annual exam.       Subjective:    Teto Knight is a 51 y.o. female who presents to [place] [address] today (9/16/2022) for follow up/evaluation of:  New Patient and Other (Patient reports chronic (more than a year) left sided upper abdominal discomfort/pressure. Denies nausea, vomiting. Reports chronic intermittent loose stools. See note)      She is here today to establish with a new physician. She has multiple concerns. She reports pain in the left side of her abdomen for approximately a year. She states that the pain has been getting worse. She describes it as a \"pressure. \"  She denies any change in symptoms related to eating. She reports increased pain prior to her period. She states that she has had increased episodes of diarrhea. Her pain decreases after bowel movements. She denies blood in bowel movements. She denies weight loss. She is taking Xarelto that was prescribed due to bilateral pulmonary emboli diagnosed in November 2021 when she had Covid. She continues to taking Xarelto. However, she generally stops Xarelto when she is no her period because her bleeding is heavy. She reports intermittent shortness of breath. She states that she used to take thyroid medication, but she kept forgetting to take it, and then she just stopped. She reports some increased stress recently related to issues with her 16year old son. She reports some anxiety, but she feels that she is managing this. She has not received a Covid-19 vaccine. She had Covid in October 2021.        Immunization history was reviewed:  Immunization History   Administered Date(s) Administered    Influenza Virus Vaccine 10/08/2013, 11/12/2015    Tdap (Boostrix, Adacel) 03/08/2013, 03/08/2014          She has the following problem list:  Patient Active Problem List   Diagnosis    COVID-19    Gastroesophageal reflux disease    Generalized anxiety disorder    High risk for colon cancer    Bilateral pulmonary embolism (HCC)    Tachycardia    Hypokalemia        Current medications are:  Outpatient Medications Marked as Taking for the 9/16/22 encounter (Office Visit) with Edilson Kapadia MD   Medication Sig Dispense Refill    acetaminophen (TYLENOL) 500 MG tablet Take 1,000 mg by mouth daily as needed for Pain      rivaroxaban (XARELTO) 20 MG TABS tablet Take 1 tablet by mouth daily (with breakfast) 30 tablet 5    metoprolol succinate (TOPROL XL) 25 MG extended release tablet Take 1.5 tablets by mouth daily Take one and half tablet daily 180 tablet 3       She is allergic to codeine, monoclonal antibodies, other, and penicillins. She is not currently a smoker. She  reports that she quit smoking about 20 years ago. Her smoking use included cigarettes. She started smoking about 32 years ago. She has a 12.00 pack-year smoking history. She has never used smokeless tobacco.      Objective:    Vitals:    09/16/22 0904   BP: 126/80   Site: Right Upper Arm   Position: Sitting   Cuff Size: Large Adult   Pulse: 59   Temp: 97.5 °F (36.4 °C)   TempSrc: Temporal   SpO2: 99%   Weight: 173 lb 3.2 oz (78.6 kg)   Height: 5' 3\" (1.6 m)     Body mass index is 30.68 kg/m². Obese female, healthy-appearing, alert, cooperative, and in no acute distress. Neck supple. No adenopathy. Thyroid symmetric, normal size. Chest:  Normal expansion. Clear to auscultation. No rales, rhonchi, or wheezing. Heart sounds are normal.  Regular rate and rhythm without murmur, gallop or rub. Lower extremities have no edema. Affect is bright. Thought processes are logical. There is no evidence of paranoia or delusions. Responses to questions are appropriate.   Dress and grooming are appropriate. Results of labs done 12/7/2021 at [facility] were reviewed with the patient:   TSH Reflex Free T4  Specimen:  Serum   Ref Range & Units 9 mo ago Comments   TSH 0.49 - 4.67 uIU/mL 4.428  Results Repeated and Confirmed   Resulting [facility]    Narrative  Performed by [facility]  Basic Metabolic Panel  Specimen:  Serum   Ref Range & Units 9 mo ago Comments   Sodium 136 - 145 mmol/L 139     Potassium 3.5 - 5.1 mmol/L 4.1     Chloride 98 - 107 mmol/L 103     CO2 21 - 32 mmol/L 27.9     Anion Gap 12 - 20 mmol//L 12.20     BUN 6 - 22 mg/dL 22     Creatinine 0.6 - 1.3 mg/dL 0.74     BUN/Creatinine Ratio 12 - 20 ratio 30 High      Glucose 70 - 110 mg/dL 113 High      Calcium 8.4 - 10.4 mg/dL 8.7     Glomerular Filtration Rate (calculated) ml/min/1.73 m2 >60     CBC  Specimen:  Serum   Ref Range & Units 9 mo ago   WBC 4.5 - 11 X10^3/uL 12.5 High     RBC 4 - 5.2 X10^6/uL 3.76 Low     Hemoglobin 12 - 16 g/dL 10.7 Low     Hematocrit 35 - 48 % 34.0 Low     MCV 82 - 98 fL 90.4    MCH 27 - 33 pg 28.5    MCHC 32 - 36 g/dL 31.5 Low     RDW 11.5 - 14.5 % 15.2 High     Platelets 488 - 471 R38^3/    MPV 6.6 - 10.4 fL 10.0    Resulting [facility]       On this date 9/16/2022 I have spent over 60 minutes reviewing previous notes, test results and face to face with the patient discussing the diagnosis and importance of compliance with the treatment plan as well as documenting on the day of the visit.        (Please note that portions of this note were completed with a voice-recognition program. Efforts were made to edit the dictation but occasionally words are mis-transcribed.)

## 2022-09-18 LAB
ESTIMATED AVERAGE GLUCOSE: 105 MG/DL
HBA1C MFR BLD: 5.3 % (ref 4–6)

## 2022-09-19 NOTE — TELEPHONE ENCOUNTER
Patient notified and verbalized understanding. Patient already picked up levothyroxine. She is agreeable to Pelvic ultrasound and stool hemoccult. Orders placed and patient will  FIT test for stool hemoccult.  Call transferred to schedule ultrasound

## 2022-09-20 ENCOUNTER — OFFICE VISIT (OUTPATIENT)
Dept: SURGERY | Age: 47
End: 2022-09-20
Payer: COMMERCIAL

## 2022-09-20 ENCOUNTER — TELEPHONE (OUTPATIENT)
Dept: FAMILY MEDICINE CLINIC | Age: 47
End: 2022-09-20

## 2022-09-20 ENCOUNTER — HOSPITAL ENCOUNTER (OUTPATIENT)
Dept: LAB | Age: 47
Discharge: HOME | End: 2022-09-20
Payer: COMMERCIAL

## 2022-09-20 VITALS
BODY MASS INDEX: 30.79 KG/M2 | HEART RATE: 68 BPM | HEIGHT: 63 IN | DIASTOLIC BLOOD PRESSURE: 84 MMHG | WEIGHT: 173.8 LBS | SYSTOLIC BLOOD PRESSURE: 122 MMHG

## 2022-09-20 DIAGNOSIS — R10.12 LUQ PAIN: ICD-10-CM

## 2022-09-20 DIAGNOSIS — R11.0 NAUSEA: ICD-10-CM

## 2022-09-20 DIAGNOSIS — Z80.0 FAMILY HX OF COLON CANCER: ICD-10-CM

## 2022-09-20 DIAGNOSIS — R10.12 LUQ PAIN: Primary | ICD-10-CM

## 2022-09-20 DIAGNOSIS — E55.9 VITAMIN D DEFICIENCY: Primary | ICD-10-CM

## 2022-09-20 LAB
ALBUMIN SERPL-MCNC: 4.3 G/DL (ref 3.5–5.2)
ALBUMIN/GLOBULIN RATIO: 1.3 (ref 1–2.5)
ALP BLD-CCNC: 64 U/L (ref 35–104)
ALT SERPL-CCNC: 10 U/L (ref 5–33)
ANION GAP SERPL CALCULATED.3IONS-SCNC: 9 MMOL/L (ref 9–17)
AST SERPL-CCNC: 18 U/L
BILIRUB SERPL-MCNC: 0.3 MG/DL (ref 0.3–1.2)
BUN BLDV-MCNC: 10 MG/DL (ref 6–20)
BUN/CREAT BLD: 14 (ref 9–20)
CALCIUM SERPL-MCNC: 9 MG/DL (ref 8.6–10.4)
CHLORIDE BLD-SCNC: 104 MMOL/L (ref 98–107)
CO2: 25 MMOL/L (ref 20–31)
CREAT SERPL-MCNC: 0.71 MG/DL (ref 0.5–0.9)
GFR AFRICAN AMERICAN: >60 ML/MIN
GFR NON-AFRICAN AMERICAN: >60 ML/MIN
GFR SERPL CREATININE-BSD FRML MDRD: NORMAL ML/MIN/{1.73_M2}
GLUCOSE BLD-MCNC: 97 MG/DL (ref 70–99)
LIPASE: 49 U/L (ref 13–60)
POTASSIUM SERPL-SCNC: 4.2 MMOL/L (ref 3.7–5.3)
SODIUM BLD-SCNC: 138 MMOL/L (ref 135–144)
TOTAL PROTEIN: 7.7 G/DL (ref 6.4–8.3)

## 2022-09-20 PROCEDURE — 36415 COLL VENOUS BLD VENIPUNCTURE: CPT

## 2022-09-20 PROCEDURE — 99204 OFFICE O/P NEW MOD 45 MIN: CPT | Performed by: SURGERY

## 2022-09-20 PROCEDURE — 83690 ASSAY OF LIPASE: CPT

## 2022-09-20 PROCEDURE — 80053 COMPREHEN METABOLIC PANEL: CPT

## 2022-09-20 RX ORDER — POLYETHYLENE GLYCOL 3350, SODIUM SULFATE ANHYDROUS, SODIUM BICARBONATE, SODIUM CHLORIDE, POTASSIUM CHLORIDE 236; 22.74; 6.74; 5.86; 2.97 G/4L; G/4L; G/4L; G/4L; G/4L
POWDER, FOR SOLUTION ORAL
Qty: 4000 ML | Refills: 0 | Status: SHIPPED | OUTPATIENT
Start: 2022-09-20

## 2022-09-20 RX ORDER — BISACODYL 5 MG
TABLET, DELAYED RELEASE (ENTERIC COATED) ORAL
Qty: 2 TABLET | Refills: 0 | Status: SHIPPED | OUTPATIENT
Start: 2022-09-20

## 2022-09-20 RX ORDER — ERGOCALCIFEROL 1.25 MG/1
50000 CAPSULE ORAL WEEKLY
Qty: 12 CAPSULE | Refills: 1 | Status: SHIPPED | OUTPATIENT
Start: 2022-09-20

## 2022-09-20 NOTE — PROGRESS NOTES
Subjective   Letitia Nieves is a 51 y.o. female who presents today for further evaluation of approximately 1 year history of left upper quadrant pain. The patient reports that about a year ago she began to have episodic pain in the left upper quadrant that she states is usually more of a pressure type pain. States that over the past couple months it seems to be getting worse with more frequency and more severity when it happens. She does report that this seems to be made worse when she is at work and heavy physical activity and lifting I will bring on the pain. However she also reports that she has noticed when she eats large amounts of food and specifically vegetables are very greasy foods will cause pain as well. Denies any emesis. Sometimes gets some nausea. Does report that her bowel movements have not changed and remain soft and easily passed. No blood or melena noted. She has been seen by her PCP for this and lab work recently did show anemia and she is having further work-up with her PCP for this. She does report that for at least the past year she has been having isolated incidence of significant heartburn though she states that recently it seems to be better. Controls this with over-the-counter antacids as needed. She does report family history of colon cancer in mother who was diagnosed in 46s. She has had a prior colonoscopy but it was about 15 years ago. Of note she was diagnosed with COVID approximately 1 year ago and ended up having pulmonary embolism which she was treated for.     Past Medical History:   Diagnosis Date    Anxiety     Bilateral pulmonary embolism ([address]     COVID-19     GERD (gastroesophageal reflux disease)     Goiter     History of COVID-19 10/2021    History of pulmonary embolism 10/2021    Hypokalemia     Hypothyroidism     Tachycardia     chronic, typically 100-110's       Past Surgical History:   Procedure Laterality Date     SECTION  2005 COLONOSCOPY  2007    with distal ileoscopy and biopsy of the distal ileum and biopsy of the rectum; nonspecific proctitis, otherwise normal colon and normal distal ileum    DENTAL SURGERY      all teeth removed due to dental caries- has dentures       Current Outpatient Medications   Medication Sig Dispense Refill    levothyroxine (SYNTHROID) 50 MCG tablet Take 1 tablet by mouth daily 30 tablet 2    acetaminophen (TYLENOL) 500 MG tablet Take 1,000 mg by mouth daily as needed for Pain      metoprolol succinate (TOPROL XL) 25 MG extended release tablet Take 1.5 tablets by mouth daily Take one and half tablet daily 180 tablet 3     No current facility-administered medications for this visit. Allergies   Allergen Reactions    Codeine Nausea Only    Monoclonal Antibodies Rash     Regeneron    Other      Decongestants- makes heart race    Penicillins      Has never taken.  Mom severely allergic       Family History   Problem Relation Age of Onset    Other Mother         palpitations; high heart rate    Thyroid Disease Mother     Colon Cancer Mother 62    Emphysema Father     Other Sister         high heart rate    Other Sister         high heart rate    High Blood Pressure Sister     High Blood Pressure Brother     No Known Problems Son        Social History     Socioeconomic History    Marital status:      Spouse name: Not on file    Number of children: Not on file    Years of education: Not on file    Highest education level: Not on file   Occupational History    Not on file   Tobacco Use    Smoking status: Former     Packs/day: 1.00     Years: 12.00     Pack years: 12.00     Types: Cigarettes     Start date:      Quit date:      Years since quittin.7    Smokeless tobacco: Never   Substance and Sexual Activity    Alcohol use: No    Drug use: No    Sexual activity: Not on file   Other Topics Concern    Not on file   Social History Narrative    Not on file     Social Determinants of Health     Financial Resource Strain: Not on file   Food Insecurity: Not on file   Transportation Needs: Not on file   Physical Activity: Not on file   Stress: Not on file   Social Connections: Not on file   Intimate Partner Violence: Not on file   Housing Stability: Not on file       ROS:   Review of Systems - General ROS: negative  Psychological ROS: negative  Ophthalmic ROS: negative  ENT ROS: negative  Respiratory ROS: no cough, shortness of breath, or wheezing  Cardiovascular ROS: no chest pain or dyspnea on exertion  Gastrointestinal ROS: Per HPI  Genito-Urinary ROS: no dysuria, trouble voiding, or hematuria  Musculoskeletal ROS: negative  Dermatological ROS: negative      Objective   Vitals:    09/20/22 0834   BP: 122/84   Pulse: 68     General:in no apparent distress and well developed and well nourished  Eyes: No gross abnormalities. Ears, Nose, Throat: hearing grossly normal bilaterally  Neck: neck supple and non tender without mass  Lungs: clear to auscultation without wheezes or rales   Heart: S1S2, no mumurs, RRR  Abdomen:, Nondistended, mild tenderness palpation in the left upper quadrant and epigastrium without guarding or rebound, bowel sounds present. Extremity: negative  Neuro: CN II-XII grossly intact      1. LUQ pain  Assessment & Plan:  Patient is having 1 year history of worsening left upper quadrant pain. Based on her description of symptoms and exacerbating factors I think we have to consider possible musculoskeletal issues. However with her having worsening symptoms with eating minute raises concern for gastrointestinal issue. At this point [address] go ahead and start with lab work to include a CMP and lipase. We will also plan for colonoscopy due to her family history of colon cancer and we will go ahead and plan for EGD as well based on her symptoms.   Risks of both procedures including bleeding, infection, perforation, need for the surgery and anesthesia risk are discussed and consent is obtained. If no clear diagnosis that explains her symptoms as identified on labs or endoscopy at that point would probably go ahead and get a CT scan for further evaluation. Orders:  -     Lipase; Future  -     Comprehensive Metabolic Panel; Future  2. Family hx of colon cancer  3.  Nausea         (Please note that portions of this note were completed with a voice recognition program.  Efforts were made to edit the dictations but occasionally words are mis-transcribed.)

## 2022-09-20 NOTE — ASSESSMENT & PLAN NOTE
Patient is having 1 year history of worsening left upper quadrant pain. Based on her description of symptoms and exacerbating factors I think we have to consider possible musculoskeletal issues. However with her having worsening symptoms with eating minute raises concern for gastrointestinal issue. At this point [address] go ahead and start with lab work to include a CMP and lipase. We will also plan for colonoscopy due to her family history of colon cancer and we will go ahead and plan for EGD as well based on her symptoms. Risks of both procedures including bleeding, infection, perforation, need for the surgery and anesthesia risk are discussed and consent is obtained. If no clear diagnosis that explains her symptoms as identified on labs or endoscopy at that point would probably go ahead and get a CT scan for further evaluation.

## 2022-09-20 NOTE — TELEPHONE ENCOUNTER
----- Message from Alek Albert MD sent at 9/19/2022  9:42 PM EDT -----  Please advise Samantha that the vitamin D level is low. I recommended that she begin Vitamin D3 50,000 units weekly. Please pend this to her pharmacy. Please order a 25-hydroxy Vitamin D level to be done in 3-4 months.

## 2022-09-22 ENCOUNTER — HOSPITAL ENCOUNTER (OUTPATIENT)
Dept: ULTRASOUND IMAGING | Age: 47
Discharge: HOME | End: 2022-09-24
Payer: COMMERCIAL

## 2022-09-22 DIAGNOSIS — Z87.42 HISTORY OF HEAVY PERIODS: ICD-10-CM

## 2022-09-22 PROCEDURE — 76856 US EXAM PELVIC COMPLETE: CPT

## 2022-09-26 ENCOUNTER — TELEPHONE (OUTPATIENT)
Dept: FAMILY MEDICINE CLINIC | Age: 47
End: 2022-09-26

## 2022-09-26 DIAGNOSIS — D21.9 FIBROID: Primary | ICD-10-CM

## 2022-09-26 DIAGNOSIS — Z87.42 HISTORY OF HEAVY PERIODS: ICD-10-CM

## 2022-09-26 NOTE — TELEPHONE ENCOUNTER
----- Message from Ulisses Alicea MD sent at 9/25/2022 10:40 PM EDT -----  Please advise the patient that the ultrasound showed a fibroid in the uterus. I recommend referral to OB-Gyn for evaluation of fibroids and heavy periods.

## 2022-09-29 ENCOUNTER — OFFICE VISIT (OUTPATIENT)
Dept: PRIMARY CARE CLINIC | Age: 47
End: 2022-09-29
Payer: COMMERCIAL

## 2022-09-29 VITALS
TEMPERATURE: 98.1 F | OXYGEN SATURATION: 98 % | DIASTOLIC BLOOD PRESSURE: 80 MMHG | WEIGHT: 171 LBS | HEIGHT: 63 IN | RESPIRATION RATE: 18 BRPM | HEART RATE: 109 BPM | BODY MASS INDEX: 30.3 KG/M2 | SYSTOLIC BLOOD PRESSURE: 114 MMHG

## 2022-09-29 DIAGNOSIS — Z86.711 HISTORY OF PULMONARY EMBOLISM: ICD-10-CM

## 2022-09-29 DIAGNOSIS — U07.1 COVID-19: Primary | ICD-10-CM

## 2022-09-29 PROBLEM — I26.99 BILATERAL PULMONARY EMBOLISM (HCC): Status: RESOLVED | Noted: 2021-11-10 | Resolved: 2022-09-29

## 2022-09-29 PROCEDURE — 99213 OFFICE O/P EST LOW 20 MIN: CPT | Performed by: FAMILY MEDICINE

## 2022-09-29 RX ORDER — NIRMATRELVIR AND RITONAVIR 300-100 MG
KIT ORAL
Qty: 30 TABLET | Refills: 0 | Status: SHIPPED | OUTPATIENT
Start: 2022-09-29 | End: 2022-10-04

## 2022-09-29 NOTE — PROGRESS NOTES
[facility]             [address] [place] [address], [address]                        Telephone [phone]             Fax [phone]       Robin Temple  :  1975  Age:  51 y.o. MRN:  [id number]  Date of visit:  2022       Assessment & Plan:    1. COVID-19  2. History of pulmonary embolism  She is at increased risk of severe Covid due to history of pulmonary embolism. I discussed risks, benefits, and side effects of Paxlovid. Paxlovid was prescribed:  - nirmatrelvir/ritonavir (PAXLOVID) 20 x 150 MG & 10 x 100MG; Take 3 tablets (two 150 mg nirmatrelvir and one 100 mg ritonavir tablets) by mouth every 12 hours for 5 days. Dispense: 30 tablet; Refill: 0     Renal function was normal on labs done 2022. Her current medications were reviewed. She was advised to monitor blood pressure and heart rate while taking Paxlovid and Metoprolol. There are no other significant medication interactions. Current CDC guidelines regarding isolation were reviewed with the patient. Printed information regarding Coronavirus (COVID-19) was provided to the patient with the after visit summary. Printed information regarding nirmatrelvir and ritonavir Brand: Paxlovid  was provided to the patient with the after visit summary. She was advised to monitor her pulse oximetry at home. She was advised to come in for evaluation the pulse oximetry is 92 or less, or if she develops chest pain or shortness of breath. She was advised to follow up if symptoms worsen or do not resolve. Subjective:    Robin Temple is a 51 y.o. female who presents to [facility] today (2022) for evaluation of:  Positive For Covid-19 (Tested positive yesterday at home. Also tested positive today. Symptoms started Tuesday night.  Congestion, sore throat, fever, left ear pain)      She states that she has not felt well for the past two days. She has had sinus congestion, sore throat, and fever. She took a home Covid test yesterday that was positive. She took another home test today that was positive. She has not had a Covid vaccine. She had Covid in November 2021. She had bilateral pulmonary emboli in November 2021. Xarelto was prescribed when the pulmonary emboli were diagnosed. She was advised earlier this month that she could discontinue Xarelto. Current medications are:  Current Outpatient Medications   Medication Sig Dispense Refill    vitamin D (ERGOCALCIFEROL) 1.25 MG (76802 UT) CAPS capsule Take 1 capsule by mouth once a week 12 capsule 1    levothyroxine (SYNTHROID) 50 MCG tablet Take 1 tablet by mouth daily 30 tablet 2    acetaminophen (TYLENOL) 500 MG tablet Take 1,000 mg by mouth daily as needed for Pain      metoprolol succinate (TOPROL XL) 25 MG extended release tablet Take 1.5 tablets by mouth daily Take one and half tablet daily 180 tablet 3    bisacodyl 5 MG EC tablet Take two tablets at noon on 10/12/22 for colonoscopy bowel prep. 2 tablet 0    polyethylene glycol (GOLYTELY) 236 g solution Mix as directed. Beginning at 4:00pm on 10/12/22 drink an eight ounce glass every ten minutes until gone. 4000 mL 0     No current facility-administered medications for this visit. She is allergic to codeine, monoclonal antibodies, other, penicillins, and prednisone. She has the following problem list:  Patient Active Problem List   Diagnosis    COVID-19    Gastroesophageal reflux disease    Generalized anxiety disorder    High risk for colon cancer    Bilateral pulmonary embolism (HCC)    Tachycardia    Hypokalemia    Chronic anticoagulation    LUQ pain    Family hx of colon cancer    Nausea        She  reports that she quit smoking about 20 years ago. Her smoking use included cigarettes. She started smoking about 32 years ago. She has a 12.00 pack-year smoking history.  She has never used smokeless tobacco.      Objective:    Vitals:    09/29/22 1557   BP: 114/80   Site: Left Upper Arm   Position: Sitting   Cuff Size: Medium Adult   Pulse: (!) 109   Resp: 18   Temp: 98.1 °F (36.7 °C)   TempSrc: Tympanic   SpO2: 98%   Weight: 171 lb (77.6 kg)   Height: 5' 3\" (1.6 m)      SpO2: 98 %       Body mass index is 30.29 kg/m². Obese female alert and cooperative. Neck supple. No adenopathy. Chest:  Normal expansion. Clear to auscultation. No rales, rhonchi, or wheezing. Respirations are not labored. Heart sounds are normal.  Regular rate and rhythm without murmur, gallop or rub. Lower extremities have no edema.     I reviewed the results of the comprehensive panel done 9/20/2022:  Hospital Outpatient Visit on 09/20/2022   Component Date Value Ref Range Status    Glucose 09/20/2022 97  70 - 99 mg/dL Final    BUN 09/20/2022 10  6 - 20 mg/dL Final    Creatinine 09/20/2022 0.71  0.50 - 0.90 mg/dL Final    Bun/Cre Ratio 09/20/2022 14  9 - 20 Final    Calcium 09/20/2022 9.0  8.6 - 10.4 mg/dL Final    Sodium 09/20/2022 138  135 - 144 mmol/L Final    Potassium 09/20/2022 4.2  3.7 - 5.3 mmol/L Final    Chloride 09/20/2022 104  98 - 107 mmol/L Final    CO2 09/20/2022 25  20 - 31 mmol/L Final    Anion Gap 09/20/2022 9  9 - 17 mmol/L Final    Alkaline Phosphatase 09/20/2022 64  35 - 104 U/L Final    ALT 09/20/2022 10  5 - 33 U/L Final    AST 09/20/2022 18  <32 U/L Final    Total Bilirubin 09/20/2022 0.3  0.3 - 1.2 mg/dL Final    Total Protein 09/20/2022 7.7  6.4 - 8.3 g/dL Final    Albumin 09/20/2022 4.3  3.5 - 5.2 g/dL Final    Albumin/Globulin Ratio 09/20/2022 1.3  1.0 - 2.5 Final    GFR Non- 09/20/2022 >60  >60 mL/min Final    GFR  09/20/2022 >60  >60 mL/min Final    GFR Comment 09/20/2022        Final    Comment: Average GFR for 38-51 years old:   80 mL/min/1.73sq m  Chronic Kidney Disease:   <60 mL/min/1.73sq m  Kidney failure:   <15 mL/min/1.73sq m              eGFR calculated using

## 2022-09-29 NOTE — LETTER
[address] [facility] A department of [facility]  Phone: [phone]  Fax: [phone]    Danay Castle MD        September 30, 2022     Patient: Lewis Bhakta   YOB: 1975   Date of Visit: 9/29/2022       To Whom It May Concern: It is my medical opinion that Yasmin Lee should remain out of work until 10/04/2022. Due to active covid infection. Upon returning to work [employer] guidelines state a mask should be worn for an additional 5 days when out in public. If you have any questions or concerns, please don't hesitate to call.     Sincerely,        Danay Castle MD

## 2022-09-29 NOTE — LETTER
[address] [facility] A department of [place] [address]  Phone: [phone]  Fax: [phone]        Logan Lara MD      September 29, 2022    Patient:   Marisela Apley  Date of Birth   1975  Date of visit   9/29/2022        To Whom it May Concern:      Hank Samuels was seen in my clinic on 9/29/2022. Please excuse from work 10/04/2022 may return to work on 10/27/2022. Pt did test positive for Covid. If you have any questions or concerns, please don't hesitate to call.       Sincerely,      Martín Baptiste MD/ramírez

## 2022-10-06 ENCOUNTER — HOSPITAL ENCOUNTER (OUTPATIENT)
Dept: MAMMOGRAPHY | Age: 47
Discharge: HOME | End: 2022-10-08
Payer: COMMERCIAL

## 2022-10-06 DIAGNOSIS — Z12.31 ENCOUNTER FOR SCREENING MAMMOGRAM FOR BREAST CANCER: ICD-10-CM

## 2022-10-06 PROCEDURE — 77063 BREAST TOMOSYNTHESIS BI: CPT

## 2022-10-25 ENCOUNTER — TELEPHONE (OUTPATIENT)
Dept: SURGERY | Age: 47
End: 2022-10-25

## 2022-10-27 ENCOUNTER — OFFICE VISIT (OUTPATIENT)
Dept: OBGYN | Age: 47
End: 2022-10-27
Payer: COMMERCIAL

## 2022-10-27 ENCOUNTER — HOSPITAL ENCOUNTER (OUTPATIENT)
Dept: LAB | Age: 47
Discharge: HOME | End: 2022-10-27
Payer: COMMERCIAL

## 2022-10-27 VITALS
WEIGHT: 174 LBS | HEART RATE: 59 BPM | DIASTOLIC BLOOD PRESSURE: 78 MMHG | SYSTOLIC BLOOD PRESSURE: 118 MMHG | BODY MASS INDEX: 30.83 KG/M2 | HEIGHT: 63 IN | OXYGEN SATURATION: 100 %

## 2022-10-27 DIAGNOSIS — R00.0 TACHYCARDIA: ICD-10-CM

## 2022-10-27 DIAGNOSIS — D25.2 SUBSEROUS LEIOMYOMA OF UTERUS: ICD-10-CM

## 2022-10-27 DIAGNOSIS — E03.9 ACQUIRED HYPOTHYROIDISM: ICD-10-CM

## 2022-10-27 DIAGNOSIS — N85.2 BULKY OR ENLARGED UTERUS: ICD-10-CM

## 2022-10-27 DIAGNOSIS — D50.0 IRON DEFICIENCY ANEMIA DUE TO CHRONIC BLOOD LOSS: ICD-10-CM

## 2022-10-27 DIAGNOSIS — N94.6 DYSMENORRHEA: ICD-10-CM

## 2022-10-27 DIAGNOSIS — N92.0 MENORRHAGIA WITH REGULAR CYCLE: Primary | ICD-10-CM

## 2022-10-27 DIAGNOSIS — N92.0 MENORRHAGIA WITH REGULAR CYCLE: ICD-10-CM

## 2022-10-27 DIAGNOSIS — Z79.01 CHRONIC ANTICOAGULATION: ICD-10-CM

## 2022-10-27 DIAGNOSIS — E04.9 GOITER: ICD-10-CM

## 2022-10-27 LAB
ABSOLUTE EOS #: 0.15 K/UL (ref 0–0.44)
ABSOLUTE IMMATURE GRANULOCYTE: <0.03 K/UL (ref 0–0.3)
ABSOLUTE LYMPH #: 1.87 K/UL (ref 1.1–3.7)
ABSOLUTE MONO #: 0.52 K/UL (ref 0.1–1.2)
BASOPHILS # BLD: 1 % (ref 0–2)
BASOPHILS ABSOLUTE: 0.07 K/UL (ref 0–0.2)
EOSINOPHILS RELATIVE PERCENT: 2 % (ref 1–4)
ESTRADIOL LEVEL: 239.1 PG/ML (ref 27–314)
FOLLICLE STIMULATING HORMONE: 8.8 MIU/ML (ref 1.7–21.5)
HCG QUANTITATIVE: <1 MIU/ML
HCT VFR BLD CALC: 31.9 % (ref 36.3–47.1)
HEMOGLOBIN: 9.2 G/DL (ref 11.9–15.1)
IMMATURE GRANULOCYTES: 0 %
INR BLD: 1
LYMPHOCYTES # BLD: 26 % (ref 24–43)
MCH RBC QN AUTO: 20.6 PG (ref 25.2–33.5)
MCHC RBC AUTO-ENTMCNC: 28.8 G/DL (ref 25.2–33.5)
MCV RBC AUTO: 71.4 FL (ref 82.6–102.9)
MONOCYTES # BLD: 7 % (ref 3–12)
NRBC AUTOMATED: 0 PER 100 WBC
PARTIAL THROMBOPLASTIN TIME: 26.2 SEC (ref 23.9–33.8)
PDW BLD-RTO: 18.5 % (ref 11.8–14.4)
PLATELET # BLD: 243 K/UL (ref 138–453)
PMV BLD AUTO: 9.7 FL (ref 8.1–13.5)
PROTHROMBIN TIME: 13 SEC (ref 11.5–14.2)
RBC # BLD: 4.47 M/UL (ref 3.95–5.11)
RBC # BLD: ABNORMAL 10*6/UL
SEG NEUTROPHILS: 64 % (ref 36–65)
SEGMENTED NEUTROPHILS ABSOLUTE COUNT: 4.63 K/UL (ref 1.5–8.1)
TSH SERPL DL<=0.05 MIU/L-ACNC: 4.66 UIU/ML (ref 0.3–5)
WBC # BLD: 7.3 K/UL (ref 3.5–11.3)

## 2022-10-27 PROCEDURE — 83001 ASSAY OF GONADOTROPIN (FSH): CPT

## 2022-10-27 PROCEDURE — 82670 ASSAY OF TOTAL ESTRADIOL: CPT

## 2022-10-27 PROCEDURE — 99203 OFFICE O/P NEW LOW 30 MIN: CPT | Performed by: OBSTETRICS & GYNECOLOGY

## 2022-10-27 PROCEDURE — 84443 ASSAY THYROID STIM HORMONE: CPT

## 2022-10-27 PROCEDURE — 85025 COMPLETE CBC W/AUTO DIFF WBC: CPT

## 2022-10-27 PROCEDURE — 36415 COLL VENOUS BLD VENIPUNCTURE: CPT

## 2022-10-27 PROCEDURE — 84702 CHORIONIC GONADOTROPIN TEST: CPT

## 2022-10-27 PROCEDURE — 85730 THROMBOPLASTIN TIME PARTIAL: CPT

## 2022-10-27 PROCEDURE — 85610 PROTHROMBIN TIME: CPT

## 2022-10-27 RX ORDER — DOCUSATE SODIUM 100 MG/1
100 CAPSULE, LIQUID FILLED ORAL DAILY PRN
Qty: 30 CAPSULE | Refills: 0 | Status: SHIPPED | OUTPATIENT
Start: 2022-10-27

## 2022-10-27 RX ORDER — FERROUS SULFATE 325(65) MG
325 TABLET ORAL 2 TIMES DAILY
Qty: 180 TABLET | Refills: 2 | Status: SHIPPED | OUTPATIENT
Start: 2022-10-27

## 2022-10-27 NOTE — PROGRESS NOTES
Digna Ahumada  10/27/2022      Digna Ahumada is a 51 y.o. female       The patient was seen today. She was here to follow-up regarding her labs and diagnostics ordered at her last visit for the diagnosis of:    ICD-10-CM    1. Menorrhagia with regular cycle  N92.0 HCG, Quantitative, Pregnancy     Protime-INR     APTT     Follicle Stimulating Hormone     Estradiol      2. Dysmenorrhea  N94.6       3. Subserous leiomyoma of uterus  D25.2       4. Iron deficiency anemia due to chronic blood loss  D50.0 ferrous sulfate (IRON 325) 325 (65 Fe) MG tablet     docusate sodium (COLACE) 100 MG capsule     CBC with Auto Differential      5. Chronic anticoagulation  Z79.01       6. Tachycardia  R00.0       7. Acquired hypothyroidism  E03.9 TSH with Reflex     US THYROID      8. Goiter  E04.9 US THYROID      9. Bulky or enlarged uterus  N85.2         Her bowels are regular and she is voiding without difficulty. No FCRNV. No dizziness sob or cp. Heavy menses for 12 months. Pt had acquired Covid 19 and then bilateral PE.   PCP stopped her Xarelto in 2022      Past Medical History:   Diagnosis Date    Anxiety     Bilateral pulmonary embolism ([address]     COVID-19     GERD (gastroesophageal reflux disease)     Goiter     History of COVID-19 10/2021    History of pulmonary embolism 10/2021    Hypokalemia     Hypothyroidism     Tachycardia     chronic, typically 100-110's         Past Surgical History:   Procedure Laterality Date     SECTION  2005    COLONOSCOPY  2007    with distal ileoscopy and biopsy of the distal ileum and biopsy of the rectum; nonspecific proctitis, otherwise normal colon and normal distal ileum    DENTAL SURGERY      all teeth removed due to dental caries- has dentures         Family History   Problem Relation Age of Onset    Other Mother         palpitations; high heart rate    Thyroid Disease Mother     Colon Cancer Mother 62    Emphysema Father     Other Sister high heart rate    Other Sister         high heart rate    High Blood Pressure Sister     High Blood Pressure Brother     No Known Problems Son          Social History     Tobacco Use    Smoking status: Former     Packs/day: 1.00     Years: 12.00     Pack years: 12.00     Types: Cigarettes     Start date:      Quit date:      Years since quittin.8    Smokeless tobacco: Never   Vaping Use    Vaping Use: Never used   Substance Use Topics    Alcohol use: No    Drug use: No         MEDICATIONS:  Current Outpatient Medications   Medication Sig Dispense Refill    ferrous sulfate (IRON 325) 325 (65 Fe) MG tablet Take 1 tablet by mouth 2 times daily 180 tablet 2    docusate sodium (COLACE) 100 MG capsule Take 1 capsule by mouth daily as needed for Constipation 30 capsule 0    vitamin D (ERGOCALCIFEROL) 1.25 MG (35959 UT) CAPS capsule Take 1 capsule by mouth once a week 12 capsule 1    levothyroxine (SYNTHROID) 50 MCG tablet Take 1 tablet by mouth daily 30 tablet 2    acetaminophen (TYLENOL) 500 MG tablet Take 1,000 mg by mouth daily as needed for Pain      metoprolol succinate (TOPROL XL) 25 MG extended release tablet Take 1.5 tablets by mouth daily Take one and half tablet daily 180 tablet 3    bisacodyl 5 MG EC tablet Take two tablets at noon on 10/12/22 for colonoscopy bowel prep. (Patient not taking: Reported on 10/27/2022) 2 tablet 0    polyethylene glycol (GOLYTELY) 236 g solution Mix as directed. Beginning at 4:00pm on 10/12/22 drink an eight ounce glass every ten minutes until gone. (Patient not taking: Reported on 10/27/2022) 4000 mL 0     No current facility-administered medications for this visit.          ALLERGIES:  Allergies as of 10/27/2022 - Fully Reviewed 10/27/2022   Allergen Reaction Noted    Codeine Nausea Only 01/15/2014    Monoclonal antibodies Rash 2021    Other  01/15/2014    Penicillins  01/15/2014    Prednisone Palpitations 2022     Review Of Systems (11 point):  Constitutional: No fever, chills or malaise; No weight change or fatigue  Head and Eyes: No vision changes, Headache, Dizziness or trauma in last 12 months  ENT ROS: No hearing, Tinnitis, sinus or taste problems  Hematological and Lymphatic ROS:No Lymphoma, Von Willebrand's, Hemophillia or Bleeding History  Psych ROS: No Depression, Homicidal thoughts,suicidal thoughts, or anxiety  Breast ROS: No breast abnormalities or lumps  Respiratory ROS: No SOB, Pneumoniae,Cough, or Pulmonary Embolism   Cardiovascular ROS: No Chest Pain with Exertion, Palpitations, Syncope, Edema, Arrhythmia  Gastrointestinal ROS: No Indigestion, Heartburn, Nausea, vomiting, Diarrhea, Constipation,or Bowel Changes; No Bloody Stools or melena  Genito-Urinary ROS: No Dysuria, Hematuria or Nocturia. No Urinary Incontinence or Vaginal Discharge; + Heavy Menses  Musculoskeletal ROS: No Arthralgia, Arthritis,Gout,Osteoporosis or Rheumatism  Neurological ROS: No CVA, Migraines, Epilepsy, Seizure Hx, or Limb Weakness  Dermatological ROS: No Rash, Itching, Hives, Mole Changes or Cancer                                                      Blood pressure 118/78, pulse 59, height 5' 3\" (1.6 m), weight 174 lb (78.9 kg), last menstrual period 10/10/2022, SpO2 100 %, not currently breastfeeding. NECK: Supple with enlarged goiter without mass effect    Cor: RRR no Murmer    Lungs: ECTB No RRW    Abdomen: Soft non-tender; good bowel sounds. No guarding, rebound or rigidity. No CVA tenderness bilaterally. Extremities: No calf tenderness, DTR 2/4, and No edema bilaterally    Pelvic: Declined         Diagnostics:  [facility]ENRY DIGITAL SCREEN BILATERAL    Result Date: 10/6/2022  EXAMINATION: SCREENING DIGITAL BILATERAL MAMMOGRAM WITH TOMOSYNTHESIS, 10/6/2022 TECHNIQUE: Screening mammography was performed with tomosynthesis including MLO and CC views of the bilateral breasts.  Computer aided detection was used for the interpretation of this exam. COMPARISON: Baseline study HISTORY: Screening. FINDINGS: The breasts are heterogeneously dense which can obscure small masses. There is no dominant mass architectural distortion or concerning grouping of microcalcification in either breast.     No mammographic evidence of malignancy BIRADS: BIRADS - CATEGORY 1 Negative. Normal interval follow-up is recommended in 12 months. OVERALL ASSESSMENT - NEGATIVE A letter of notification will be sent to the patient regarding the results. The [employer] recommends annual mammograms for women 40 years and older. EXAMINATION:   PELVIC ULTRASOUND       9/22/2022       TECHNIQUE:   Transabdominal images only were performed. COMPARISON:   None       HISTORY:   ORDERING SYSTEM PROVIDED HISTORY: History of heavy periods   TECHNOLOGIST PROVIDED HISTORY:       Reason for Exam: heavy periods       FINDINGS:   Patient body habitus limits the study, as there is increased attenuation of   the ultrasound beam. This decreases sensitivity and specificity. Measurements:       Uterus: 10.8 x 7.1 x 5.8 cm       Endometrial stripe: 5 mm       Right Ovary:3.0 x 2.8 x 1.8 cm       Left Ovary: 2.7 x 2.7 x 2.6 cm           Ultrasound Findings:       Uterus: Lower anterior fundal fibroid is suspected measuring 3.0 x 2.8 x 2.1   cm       Endometrial stripe: Endometrial stripe is within normal limits. Right Ovary: Morphology appears normal .  Flow was not assessed       Left Ovary:  Morphology appears normal.  Flow was not assessed       Free Fluid: No evidence of free fluid. Impression   Lower anterior fundal fibroid.        Normal morphology of the ovaries           Lab Results:  Results for orders placed or performed during the hospital encounter of 09/20/22   Comprehensive Metabolic Panel   Result Value Ref Range    Glucose 97 70 - 99 mg/dL    BUN 10 6 - 20 mg/dL    Creatinine 0.71 0.50 - 0.90 mg/dL    Bun/Cre Ratio 14 9 - 20    Calcium 9.0 8.6 - 10.4 mg/dL    Sodium 138 135 - 144 mmol/L    Potassium 4.2 3.7 - 5.3 mmol/L    Chloride 104 98 - 107 mmol/L    CO2 25 20 - 31 mmol/L    Anion Gap 9 9 - 17 mmol/L    Alkaline Phosphatase 64 35 - 104 U/L    ALT 10 5 - 33 U/L    AST 18 <32 U/L    Total Bilirubin 0.3 0.3 - 1.2 mg/dL    Total Protein 7.7 6.4 - 8.3 g/dL    Albumin 4.3 3.5 - 5.2 g/dL    Albumin/Globulin Ratio 1.3 1.0 - 2.5    GFR Non-African American >60 >60 mL/min    GFR African American >60 >60 mL/min    GFR Comment         Lipase   Result Value Ref Range    Lipase 49 13 - 60 U/L     Hemoglobin A1C  Order: [id number]  Status: Final result    Visible to patient: No (not released)    Next appt: 11/01/2022 at 02:50 PM in Cardiology (Emelia Ramírez DO)    Dx: Elevated glucose    2 Result Notes    1 Follow-up Encounter  Component Ref Range & Units 9/16/22 1046    Hemoglobin A1C 4.0 - 6.0 % 5.3    Estimated Avg Glucose mg/dL 105    Comment: The ADA and AACC recommend providing the estimated average glucose result to permit better           Next appt: 11/01/2022 at 02:50 PM in Cardiology Ryan Cunningham DO)    Dx: History of anemia    2 Result Notes    2 Follow-up Encounters  Component Ref Range & Units 9/16/22 1045 11/25/21 1239 11/11/21 0338 11/10/21 1911 11/29/19 1907   WBC 3.5 - 11.3 k/uL 5.6  6.6  10.5  9.8  8.1 R    RBC 3.95 - 5.11 m/uL 4.45  4.28  3.90 Low   4.21  4.64 R    Hemoglobin 11.9 - 15.1 g/dL 8.9 Low   12.1  11.1 Low   12.0  14.0 R    Hematocrit 36.3 - 47.1 % 32.0 Low   38.1  33.1 Low   35.8 Low   41.7 R    MCV 82.6 - 102.9 fL 71.9 Low   89.0  84.9  85.0  90.0 R    MCH 25.2 - 33.5 pg 20.0 Low   28.3  28.5  28.5  30.3 R    MCHC 25.2 - 33.5 g/dL 27.8  31.8  33.5  33.5  33.6 R    RDW 11.8 - 14.4 % 19.3 High   14.3  13.3  13.2  13.9 R    Platelets 938 - 791 k/uL 317  392  311  333  205 R    MPV 8.1 - 13.5 fL 9.8  9.0  9.2  9.1  7.7 R    NRBC Automated 0.0 per 100 WBC 0.0  0.0  0.0  0.0  NOT REPORTED R    Monocytes 3 - 12 % 7  8  9  8  3 Low  R Lymphocytes 24 - 43 % 32  33  15 Low   10 Low   4 Low  R    Seg Neutrophils 36 - 65 % 58  55  76 High   81 High   92 High  R    Eosinophils % 1 - 4 % 2  3  0 Low   0 Low   0 Low  R    Basophils 0 - 2 % 1  1  0  0  1 R    Immature Granulocytes 0 % 0  0  0  1 High   NOT REPORTED    Absolute Mono # 0.10 - 1.20 k/uL 0.39  0.53  0.90  0.81  0.24 R    Absolute Lymph # 1.10 - 3.70 k/uL 1.79  2.18  1.53  1.01 Low   0.32 Low  R    Segs Absolute 1.50 - 8.10 k/uL 3.25  3.67  7.97  7.89  7.46 R    Absolute Eos # 0.00 - 0.44 k/uL 0.11  0.17  0.03  0.03  0.00 R    Basophils Absolute 0.00 - 0.20 k/uL 0.06  0.04  0.03  <0.03  0.08 R    Absolute Immature Granulocyte 0.00 - 0.30 k/uL 0.             Comprehensive Metabolic Panel  Order: [id number]  Status: Final result    Visible to patient: No (not released)    Next appt: 11/01/2022 at 02:50 PM in Cardiology Valentin Ramírez DO)    Dx: Elevated glucose    2 Result Notes    2 Follow-up Encounters  Component Ref Range & Units 9/16/22 1045 11/25/21 1239 11/11/21 0338 11/10/21 1911 11/29/19 1907   Glucose 70 - 99 mg/dL 86  98  114 High   108 High   119 High     BUN 6 - 20 mg/dL 10  10  4 Low   6  13    Creatinine 0.50 - 0.90 mg/dL 0.58  0.50  0.46 Low   0.49 Low   0.54    Bun/Cre Ratio 9 - 20 17  20  9  12  24 High     Calcium 8.6 - 10.4 mg/dL 8.9  8.8  8.3 Low   8.7  9.1    Sodium 135 - 144 mmol/L 139  136  136  133 Low   138    Potassium 3.7 - 5.3 mmol/L 4.0  3.4 Low   3.5 Low   3.6 Low   3.6 Low     Chloride 98 - 107 mmol/L 105  100  102  98  102    CO2 20 - 31 mmol/L 24  22  21  22  22    Anion Gap 9 - 17 mmol/L 10  14  13  13  14    Alkaline Phosphatase 35 - 104 U/L 71  68  58  61     ALT 5 - 33 U/L 9  10  12  13     AST <32 U/L 17  19  14  15     Total Bilirubin 0.3 - 1.2 mg/dL 0.3  0.41  0.88  0.83     Total Protein 6.4 - 8.3 g/dL 7.9  7.5  6.8  7.0     Albumin 3.5 - 5.2 g/dL 4.3  3.9  3.4 Low   3.6     Albumin/Globulin Ratio 1.0 - 2.5 1.2  1.1  1.0  1.1     GFR Non- American >60 mL/min >60  >60  >60  >60  >60    GFR  >60 mL/min >60  >60  >60  >60  >60    GFR Comment             CM       CM       CM       CM    Comment: Average GFR for 38-51 years old:    80 mL/min/1.73sq m   Chronic Kidney Disease:    <60 mL/min/1.73sq m   Kidney failure:    <15 mL/min/1.73sq m                 TSH  Order: [id number]  Status: Final result    Visible to patient: No (not released)    Next appt: 11/01/2022 at 02:50 PM in Cardiology Ansley Antonio Darrell, DO)    Dx: Acquired hypothyroidism    2 Result Notes    2 Follow-up Encounters  Component Ref Range & Units 9/16/22 1045 11/11/21 0338   TSH 0.30 - 5.00 uIU/mL 13.60 High   14.87 High  R    Resulting Agency  [facility]              Specimen Collected: 09/16/22 10:45 EDT           Contains abnormal data T4, Free  Order: [id number]  Status: Final result    Visible to patient: No (not released)    Next appt: 11/01/2022 at 02:50 PM in Cardiology ([facility] Darrell, DO)    Dx: Acquired hypothyroidism    2 Result Notes    1 Follow-up Encounter  Component Ref Range & Units 9/16/22 1045 11/11/21 0338   Thyroxine, Free 0.93 - 1.70 ng/dL 0.78 Low   0.92 Low     Resulting Agency  [facility]              Specimen Collected: 09/16/22 10:45 EDT Last Resulted: 09/16/22 23:28 EDT                  Assessment:   Diagnosis Orders   1. Menorrhagia with regular cycle  HCG, Quantitative, Pregnancy    Protime-INR    APTT    Follicle Stimulating Hormone    Estradiol      2. Dysmenorrhea        3. Subserous leiomyoma of uterus        4. Iron deficiency anemia due to chronic blood loss  ferrous sulfate (IRON 325) 325 (65 Fe) MG tablet    docusate sodium (COLACE) 100 MG capsule    CBC with Auto Differential      5. Chronic anticoagulation        6. Tachycardia        7. Acquired hypothyroidism  TSH with Reflex    US THYROID      8. Goiter  US THYROID      9.  Bulky or enlarged uterus          Chief Complaint   Patient presents with    Other     Discuss uterine fibroid issues. Patient Active Problem List    Diagnosis Date Noted    Menorrhagia with regular cycle 10/27/2022     Priority: High    Subserous leiomyoma of uterus 10/27/2022     Priority: High    Dysmenorrhea 10/27/2022     Priority: High    Iron deficiency anemia due to chronic blood loss 10/27/2022     Priority: High    History of pulmonary embolism 09/29/2022     Priority: High    LUQ pain 09/20/2022     Priority: Medium    Family hx of colon cancer 09/20/2022     Priority: Medium    Nausea 09/20/2022     Priority: Medium    Tachycardia      Priority: Medium    Gastroesophageal reflux disease 05/05/2021     Priority: Medium    Chronic anticoagulation 09/16/2022     Priority: Low     Stopped med 9/2022      Hypokalemia     COVID-19 11/02/2021    Generalized anxiety disorder 05/05/2021    High risk for colon cancer 05/05/2021       PLAN:  Return in about 2 weeks (around 11/10/2022) for Follow-Up On Current Problem pap. PAP at FU  FIT test ordered not completed-pt to do  D&C Hscope +/- Myosure vs Office EMB hscope-No clearance  Recheck thyroid labs and get ultrasound of neck goiter  FESO4 325 mg bid  Labs ordered  Return to the office in 2 weeks. Barrier recommendations and STD counseling was completed  Counseled on preventative health maintenance follow-up. Orders Placed This Encounter   Procedures    US THYROID           Standing Status:   Future     Standing Expiration Date:   10/27/2023    CBC with Auto Differential     Standing Status:   Future     Standing Expiration Date:   10/27/2023    TSH with Reflex     Standing Status:   Future     Standing Expiration Date:   10/27/2023    HCG, Quantitative, Pregnancy     Standing Status:   Future     Standing Expiration Date:   10/27/2023    Protime-INR     Standing Status:   Future     Standing Expiration Date:   10/27/2023     Order Specific Question:   Daily Coumadin Dose?      Answer:   N APTT     Standing Status:   Future     Standing Expiration Date:   10/27/2023     Order Specific Question:   Daily Heparin Dose? Answer:   N    Follicle Stimulating Hormone     Standing Status:   Future     Standing Expiration Date:   10/27/2023    Estradiol     Standing Status:   Future     Standing Expiration Date:   10/27/2023           The patient, Mahogany Castro is a 51 y.o. female, was seen with a total time spent of 30 minutes for the visit on this date of service by the E/M provider. The time component had both face to face and non face to face time spent in determining the total time component. Counseling and education regarding her diagnosis listed below and her options regarding those diagnoses were also included in determining her time component. Diagnosis Orders   1. Menorrhagia with regular cycle  HCG, Quantitative, Pregnancy    Protime-INR    APTT    Follicle Stimulating Hormone    Estradiol      2. Dysmenorrhea        3. Subserous leiomyoma of uterus        4. Iron deficiency anemia due to chronic blood loss  ferrous sulfate (IRON 325) 325 (65 Fe) MG tablet    docusate sodium (COLACE) 100 MG capsule    CBC with Auto Differential      5. Chronic anticoagulation        6. Tachycardia        7. Acquired hypothyroidism  TSH with Reflex    US THYROID      8. Goiter  US THYROID      9. Bulky or enlarged uterus             The patient had her preventative health maintenance recommendations and follow-up reviewed with her at the completion of her visit.

## 2022-11-17 ENCOUNTER — TELEPHONE (OUTPATIENT)
Dept: OBGYN | Age: 47
End: 2022-11-17

## 2022-12-06 ENCOUNTER — OFFICE VISIT (OUTPATIENT)
Dept: CARDIOLOGY | Age: 47
End: 2022-12-06
Payer: COMMERCIAL

## 2022-12-06 VITALS
DIASTOLIC BLOOD PRESSURE: 80 MMHG | OXYGEN SATURATION: 98 % | SYSTOLIC BLOOD PRESSURE: 116 MMHG | WEIGHT: 176.9 LBS | HEART RATE: 82 BPM | BODY MASS INDEX: 31.34 KG/M2 | HEIGHT: 63 IN

## 2022-12-06 DIAGNOSIS — R00.0 TACHYCARDIA: Primary | ICD-10-CM

## 2022-12-06 PROCEDURE — 99214 OFFICE O/P EST MOD 30 MIN: CPT | Performed by: INTERNAL MEDICINE

## 2022-12-06 PROCEDURE — 93000 ELECTROCARDIOGRAM COMPLETE: CPT | Performed by: INTERNAL MEDICINE

## 2022-12-06 NOTE — PROGRESS NOTES
[place] [address]  [address]  [facility] [address]  Dept: [phone]    Subjective: The patient is a 49y.o. year old, , female is in the office for a follow up   She is stable from cardiac standpoint, no further episodes of tachycardia after starting Toprol-XL, denies any significant palpitations. Denies any chest pain or dyspnea. She reports that she had second COVID infection earlier this year with mild symptoms which are now resolved. Past Medical History:   has a past medical history of Anxiety, Bilateral pulmonary embolism ([facility] [address] COVID-19, GERD (gastroesophageal reflux disease), Goiter, History of COVID-19, History of pulmonary embolism, Hypokalemia, Hypothyroidism, and Tachycardia. Past Surgical History:   has a past surgical history that includes  section (2005); Colonoscopy (2007); and Dental surgery (). Home Medications:  Prior to Admission medications    Medication Sig Start Date End Date Taking?  Authorizing Provider   ferrous sulfate (IRON 325) 325 (65 Fe) MG tablet Take 1 tablet by mouth 2 times daily 10/27/22  Yes Brandon Bolden DO   docusate sodium (COLACE) 100 MG capsule Take 1 capsule by mouth daily as needed for Constipation 10/27/22  Yes Brandon Bolden,    vitamin D (ERGOCALCIFEROL) 1.25 MG (11046 UT) CAPS capsule Take 1 capsule by mouth once a week 22  Yes Tee Mitchell MD   levothyroxine (SYNTHROID) 50 MCG tablet Take 1 tablet by mouth daily 22  Yes Tee Mitchell MD   acetaminophen (TYLENOL) 500 MG tablet Take 1,000 mg by mouth daily as needed for Pain   Yes Historical Provider, MD   metoprolol succinate (TOPROL XL) 25 MG extended release tablet Take 1.5 tablets by mouth daily Take one and half tablet daily 21  Yes Gary Son MD   bisacodyl 5 MG EC tablet Take two tablets at noon on respiratory effort. No for increased work of breathing. On auscultation: clear to auscultation bilaterally  Cardiovascular: The apical impulse is not displaced  Heart tones are crisp and normal. regular S1 and S2.  Jugular venous pulsation Normal  The carotid upstroke is normal in amplitude and contour without delay or bruit  Peripheral pulses are symmetrical and full   Abdomen:   No masses or tenderness  Bowel sounds present  Extremities:   No Cyanosis or Clubbing   Lower extremity edema: No   Skin: Warm and dry    Cardiac Data:  EKG 12/6/2022 normal sinus rhythm, normal ECG    TTE 11/11/2021  Summary  Normal LV size and systolic function. Estimated ejection fraction is 60%. No significant valvular regurgitation or stenosis seen. No evidence of pericardial effusion. CTA 11/10/21 chest yesterday showed acute bilateral PE      Labs:     CBC: No results for input(s): WBC, HGB, HCT, PLT in the last 72 hours. BMP: No results for input(s): NA, K, CO2, BUN, CREATININE, LABGLOM, GLUCOSE in the last 72 hours. PT/INR: No results for input(s): PROTIME, INR in the last 72 hours. FASTING LIPID PANEL:  Lab Results   Component Value Date/Time    CHOL 112 09/16/2022 10:45 AM    HDL 27 09/16/2022 10:45 AM    LDLCHOLESTEROL 66 09/16/2022 10:45 AM    TRIG 93 09/16/2022 10:45 AM    CHOLHDLRATIO 4.1 09/16/2022 10:45 AM     LIVER PROFILE:No results for input(s): AST, ALT, LABALBU in the last 72 hours.       IMPRESSION:    Sinus tachycardia, related to COVID-19/hypoxia and PE, resolved on beta-blocker  Normal LVEF  History of bilateral PE after COVID-19 infection in 2021    Patient Active Problem List   Diagnosis    COVID-19    Gastroesophageal reflux disease    Generalized anxiety disorder    High risk for colon cancer    Tachycardia    Hypokalemia    Chronic anticoagulation    LUQ pain    Family hx of colon cancer    Nausea    History of pulmonary embolism    Menorrhagia with regular cycle    Subserous leiomyoma of uterus Dysmenorrhea    Iron deficiency anemia due to chronic blood loss       RECOMMENDATIONS:    Continue Toprol-XL at current dose  Patient has finished therapeutic anticoagulation and now off per pcp  Counseled to avoid any caffeinated drinks   Diet and exercise discussed  Routine follow-up on annual basis    Shivam Salas [address] Cardiology Consult           [phone]

## 2022-12-07 RX ORDER — METOPROLOL SUCCINATE 25 MG/1
TABLET, EXTENDED RELEASE ORAL
Qty: 180 TABLET | Refills: 3 | Status: SHIPPED | OUTPATIENT
Start: 2022-12-07

## 2022-12-09 ENCOUNTER — HOSPITAL ENCOUNTER (OUTPATIENT)
Dept: ULTRASOUND IMAGING | Age: 47
End: 2022-12-09
Payer: COMMERCIAL

## 2022-12-09 DIAGNOSIS — E04.1 THYROID NODULE: Primary | ICD-10-CM

## 2022-12-09 DIAGNOSIS — E03.9 ACQUIRED HYPOTHYROIDISM: ICD-10-CM

## 2022-12-09 DIAGNOSIS — E04.9 GOITER: ICD-10-CM

## 2022-12-09 PROCEDURE — 76536 US EXAM OF HEAD AND NECK: CPT

## 2022-12-21 ENCOUNTER — TELEPHONE (OUTPATIENT)
Dept: FAMILY MEDICINE CLINIC | Age: 47
End: 2022-12-21

## 2022-12-21 NOTE — TELEPHONE ENCOUNTER
Attempted to reach patient regarding missed appointment on 12/21/22. Unable to contact at this time. Left message to reschedule.

## 2023-01-04 NOTE — PROGRESS NOTES
12/3/2021     Kyleigh Worley (:  1975) is a 51 y.o. female, here for evaluation of the following medical concerns:    Chest Pain   This is a new problem. The current episode started more than 1 month ago (had covid about 6 weeks ago, then ended up getting pneumonia and PE. Treated with antibiotic and blood thinners started 3 weeks ago). Associated symptoms include shortness of breath. Pertinent negatives include no fever. Associated symptoms comments: Started having pain in the right lower lobe around Thanksgiving. Went to the ER at that time and had repeat imaging and was told she had pleurisy. Once those were finished she started having pain again. Still has some shortness of breath with exertion and has a hard time catching her breath. .     Did review patient's med list, allergies, social history,pmhx and pshx today as noted in the record. Review of Systems   Constitutional: Negative for chills, fatigue and fever. HENT: Negative. Eyes: Negative. Respiratory: Positive for shortness of breath. Negative for chest tightness and wheezing. Cardiovascular: Positive for chest pain. Gastrointestinal: Negative. Prior to Visit Medications    Medication Sig Taking?  Authorizing Provider   metoprolol succinate (TOPROL XL) 25 MG extended release tablet Take 1.5 tablets by mouth daily Take one and half tablet daily Yes Penny Alaniz MD   thyroid (ARMOUR) 30 MG tablet Take 1 tablet by mouth every morning (before breakfast) Yes Nadthony Labrador   rivaroxaban (XARELTO) 20 MG TABS tablet Take 1 tablet by mouth daily (with breakfast) Yes Nadyne Labrador   rivaroxaban (XARELTO) 15 MG TABS tablet Take 1 tablet by mouth daily (with breakfast) for 21 days  Yasmin Markham        Social History     Tobacco Use    Smoking status: Former Smoker     Packs/day: 1.00     Years: 12.00     Pack years: 12.00     Types: Cigarettes     Start date:      Quit date:      Years since quittin.9    Health Maintenance Due   Topic Date Due   • Hepatitis B Vaccine (2 of 3 - 3-dose series) 2022   • IPV Vaccine (2 of 4 - 4-dose series) 2022   • HIB Vaccine (2 of 3 - PRP-OMP Series) 2022   • DTaP/Tdap/Td Vaccine (2 - DTaP) 2022   • COVID-19 Vaccine (1) Never done   • Pneumococcal Vaccine 0-64 (2) 2022   • Influenza Vaccine (1 of 2) Never done   • Well Child Exam 9 Months  2022       Patient is due for topics listed above, she wishes to proceed with Immunization(s) Dtap/Tdap/Td, Hep B, HIB, IPV and Pneumococcal and Well Child Exam, but is not proceeding with Immunization(s) COVID-19 and Influenza at this time. The following has occurred: dad declined covid-19 and flu.     Smokeless tobacco: Never Used   Substance Use Topics    Alcohol use: No        Vitals:    12/03/21 1030   BP: 120/74   Site: Right Upper Arm   Position: Sitting   Cuff Size: Large Adult   Pulse: 120   Temp: 98.2 °F (36.8 °C)   TempSrc: Tympanic   SpO2: 98%   Weight: 158 lb (71.7 kg)     Estimated body mass index is 28.9 kg/m² as calculated from the following:    Height as of 11/25/21: 5' 2\" (1.575 m). Weight as of this encounter: 158 lb (71.7 kg). Physical Exam  Vitals and nursing note reviewed. Constitutional:       General: She is not in acute distress. Appearance: She is well-developed. She is not diaphoretic. HENT:      Head: Normocephalic and atraumatic. Right Ear: Tympanic membrane normal.      Left Ear: Tympanic membrane normal.      Nose: Nose normal. No congestion or rhinorrhea. Mouth/Throat:      Mouth: Mucous membranes are moist.      Pharynx: Oropharynx is clear. No oropharyngeal exudate or posterior oropharyngeal erythema. Eyes:      Conjunctiva/sclera: Conjunctivae normal.   Cardiovascular:      Rate and Rhythm: Normal rate and regular rhythm. Pulmonary:      Effort: Pulmonary effort is normal.      Breath sounds: Normal breath sounds. No wheezing, rhonchi or rales. Comments: Reproducible pain with palpation to the anterior and posterior lower chest wall. There is a posterior placement of the right lower rib 9 and some posterior positioning of the left rib 8. This is in the area of the pain. Chest:      Chest wall: Tenderness present. Musculoskeletal:      Cervical back: Normal range of motion. Lymphadenopathy:      Cervical: No cervical adenopathy. Skin:     General: Skin is warm and dry. Coloration: Skin is not pale. Findings: No erythema or rash. Neurological:      Mental Status: She is alert and oriented to person, place, and time. Psychiatric:         Behavior: Behavior normal.         Thought Content:  Thought content normal. Judgment: Judgment normal.         ASSESSMENT/PLAN:  Encounter Diagnoses   Name Primary?  Chest wall pain Yes    Rib cage region somatic dysfunction      Orders Placed This Encounter   Medications    predniSONE (DELTASONE) 20 MG tablet     Si bid for 5 days     Dispense:  10 tablet     Refill:  0     Orders Placed This Encounter   Procedures    AK OSTEOPATHIC MANIP,1-2 BODY REGN     OMT is provided today to the bilateral lower ribs using manual therapy, muscle energy, and HVLA with good release. Patient did have improvement in her pain after treatment. Will give patient additional prednisone to take to help with any residual inflammation in the chest wall. Patient is to continue on her blood thinners as ordered. Did advise patient that she should use a rolled up towel under the bilateral lower ribs and lay in a supine position to try to help keep the ribs from posteriorly moving. Patient can follow-up with myself if she does need additional manipulative treatment. Can use Tylenol if needed for pain. Return  if no improvement in symptoms or if any further symptoms arise. (Please note that portions of this note were completed with a voice recognition program. Efforts were made to edit the dictations but occasionally words are mis-transcribed.)        No follow-ups on file. An electronic signature was used to authenticate this note.     --Polo Breen,  on 12/3/2021 at 10:42 AM

## 2023-01-05 ENCOUNTER — TELEPHONE (OUTPATIENT)
Dept: SURGERY | Age: 48
End: 2023-01-05

## 2023-01-05 NOTE — TELEPHONE ENCOUNTER
1/5/2023 Patient's  called at 9:53am today to cancel his wife's Colonoscopy and EGD for today with Dr Harshal Falcon. He states she has had to work and was unable to prep. Dr. Adriana Braden office nurse notified.

## 2023-01-16 DIAGNOSIS — E03.9 ACQUIRED HYPOTHYROIDISM: ICD-10-CM

## 2023-01-17 RX ORDER — LEVOTHYROXINE SODIUM 0.05 MG/1
50 TABLET ORAL DAILY
Qty: 30 TABLET | Refills: 2 | Status: SHIPPED | OUTPATIENT
Start: 2023-01-17

## 2023-06-08 DIAGNOSIS — E03.9 ACQUIRED HYPOTHYROIDISM: ICD-10-CM

## 2023-06-08 RX ORDER — LEVOTHYROXINE SODIUM 0.05 MG/1
50 TABLET ORAL DAILY
Qty: 30 TABLET | Refills: 2 | Status: SHIPPED | OUTPATIENT
Start: 2023-06-08

## 2023-06-08 NOTE — TELEPHONE ENCOUNTER
Jim Phan called requesting a refill of the below medication which has been pended for you:     Requested Prescriptions     Pending Prescriptions Disp Refills    levothyroxine (SYNTHROID) 50 MCG tablet 30 tablet 2     Sig: Take 1 tablet by mouth daily       Last Appointment Date: 9/29/2022  Next Appointment Date: Visit date not found (unable to schedule at this time)    Allergies   Allergen Reactions    Codeine Nausea Only    Monoclonal Antibodies Rash     Regeneron    Other      Decongestants- makes heart race    Penicillins      Has never taken.  Mom severely allergic    Prednisone Palpitations

## 2024-02-08 ENCOUNTER — OFFICE VISIT (OUTPATIENT)
Dept: PRIMARY CARE CLINIC | Age: 49
End: 2024-02-08
Payer: COMMERCIAL

## 2024-02-08 VITALS
RESPIRATION RATE: 15 BRPM | WEIGHT: 174.5 LBS | HEART RATE: 85 BPM | HEIGHT: 63 IN | OXYGEN SATURATION: 95 % | TEMPERATURE: 98.6 F | BODY MASS INDEX: 30.92 KG/M2 | SYSTOLIC BLOOD PRESSURE: 120 MMHG | DIASTOLIC BLOOD PRESSURE: 76 MMHG

## 2024-02-08 DIAGNOSIS — J01.10 ACUTE NON-RECURRENT FRONTAL SINUSITIS: Primary | ICD-10-CM

## 2024-02-08 PROCEDURE — 99213 OFFICE O/P EST LOW 20 MIN: CPT | Performed by: NURSE PRACTITIONER

## 2024-02-08 RX ORDER — DOXYCYCLINE HYCLATE 100 MG
100 TABLET ORAL 2 TIMES DAILY
Qty: 20 TABLET | Refills: 0 | Status: SHIPPED | OUTPATIENT
Start: 2024-02-08 | End: 2024-02-18

## 2024-02-08 ASSESSMENT — ENCOUNTER SYMPTOMS
COUGH: 0
RHINORRHEA: 0
SORE THROAT: 0
RESPIRATORY NEGATIVE: 1
SINUS PRESSURE: 1
SINUS COMPLAINT: 1
SHORTNESS OF BREATH: 0
HOARSE VOICE: 0

## 2024-02-08 NOTE — PROGRESS NOTES
[facility]             [address] [address]                        Telephone [phone]             Fax [phone]     Samantha Serrato  1975  MRN:[id number]   Date of visit:  2/8/2024    Subjective:    Samantha Serrato is a 48 y.o.  female who presents to [facility] today (2/8/2024) for evaluation of:    Chief Complaint   Patient presents with    Congestion     She is here for congestion that has been going on for a week now, she reports having jaw pain with it. She is also here for a headache        Sinus Problem  This is a new problem. The current episode started in the past 7 days (X 1 week). The problem has been gradually worsening since onset. There has been no fever. Her pain is at a severity of 8/10. Associated symptoms include congestion, headaches (intermittent) and sinus pressure. Pertinent negatives include no chills, coughing, ear pain, hoarse voice, shortness of breath or sore throat. (Left ear fullness and ache into left jaw.) Treatments tried: sudafed cold, ibuprofen. The treatment provided mild relief.       She has the following problem list:  Patient Active Problem List   Diagnosis    COVID-19    Gastroesophageal reflux disease    Generalized anxiety disorder    High risk for colon cancer    Tachycardia    Hypokalemia    Chronic anticoagulation    LUQ pain    Family hx of colon cancer    Nausea    History of pulmonary embolism    Menorrhagia with regular cycle    Subserous leiomyoma of uterus    Dysmenorrhea    Iron deficiency anemia due to chronic blood loss        Current medications are:  Current Outpatient Medications   Medication Sig Dispense Refill    doxycycline hyclate (VIBRA-TABS) 100 MG tablet Take 1 tablet by mouth 2 times daily for 10 days 20 tablet 0    metoprolol succinate (TOPROL XL) 25 MG extended release tablet TAKE 1 AND 1/2 TABLETS BY MOUTH ONCE DAILY 180

## 2024-02-26 NOTE — TELEPHONE ENCOUNTER
The patient needs a refill of metoprolol 90 day supply sent to [facility] in [place].     Last Appt:  12/6/2022  Next Appt:   3/18/2024  Med verified in Epic

## 2024-02-27 RX ORDER — METOPROLOL SUCCINATE 25 MG/1
37.5 TABLET, EXTENDED RELEASE ORAL DAILY
Qty: 180 TABLET | Refills: 0 | Status: SHIPPED | OUTPATIENT
Start: 2024-02-27

## 2024-04-12 ENCOUNTER — OFFICE VISIT (OUTPATIENT)
Dept: CARDIOLOGY | Age: 49
End: 2024-04-12
Payer: COMMERCIAL

## 2024-04-12 VITALS
HEIGHT: 63 IN | WEIGHT: 180 LBS | BODY MASS INDEX: 31.89 KG/M2 | HEART RATE: 71 BPM | SYSTOLIC BLOOD PRESSURE: 102 MMHG | DIASTOLIC BLOOD PRESSURE: 62 MMHG

## 2024-04-12 DIAGNOSIS — R00.0 TACHYCARDIA: Primary | ICD-10-CM

## 2024-04-12 DIAGNOSIS — E78.5 HYPERLIPIDEMIA, UNSPECIFIED HYPERLIPIDEMIA TYPE: ICD-10-CM

## 2024-04-12 DIAGNOSIS — Z86.711 HISTORY OF PULMONARY EMBOLUS (PE): ICD-10-CM

## 2024-04-12 DIAGNOSIS — I10 PRIMARY HYPERTENSION: ICD-10-CM

## 2024-04-12 PROCEDURE — 3078F DIAST BP <80 MM HG: CPT | Performed by: INTERNAL MEDICINE

## 2024-04-12 PROCEDURE — 3074F SYST BP LT 130 MM HG: CPT | Performed by: INTERNAL MEDICINE

## 2024-04-12 PROCEDURE — 93000 ELECTROCARDIOGRAM COMPLETE: CPT | Performed by: INTERNAL MEDICINE

## 2024-04-12 PROCEDURE — 99214 OFFICE O/P EST MOD 30 MIN: CPT | Performed by: INTERNAL MEDICINE

## 2024-04-12 RX ORDER — METOPROLOL SUCCINATE 25 MG/1
37.5 TABLET, EXTENDED RELEASE ORAL DAILY
Qty: 135 TABLET | Refills: 3 | Status: SHIPPED | OUTPATIENT
Start: 2024-04-12

## 2024-04-12 RX ORDER — IBUPROFEN 200 MG
400 TABLET ORAL EVERY OTHER DAY
COMMUNITY

## 2024-04-12 NOTE — PROGRESS NOTES
[facility] A DEPARTMENT OF [address] [address]  Dept: [phone]    Subjective:  The patient is a 48 y.o. year old, , female is in the office for a follow up visit.  Patient is doing quit well from cardiac stand point. She denies any chest pain or discomfort. No orthopnea or PND. Denies any palpitation, dizziness or syncope. She is completely asymptomatic from cardiac stand point.    Past Medical History:   has a past medical history of Anxiety, Bilateral pulmonary embolism (HCC), COVID-19, GERD (gastroesophageal reflux disease), Goiter, History of COVID-19, History of pulmonary embolism, Hypokalemia, Hypothyroidism, and Tachycardia.    Past Surgical History:   has a past surgical history that includes  section (2005); Colonoscopy (2007); and Dental surgery ().    Home Medications:  Prior to Admission medications    Medication Sig Start Date End Date Taking? Authorizing Provider   ibuprofen (ADVIL;MOTRIN) 200 MG tablet Take 2 tablets by mouth every other day   Yes Provider, MD Asad   metoprolol succinate (TOPROL XL) 25 MG extended release tablet Take 1.5 tablets by mouth daily 24  Yes Daphnie Carrera APRN - CNP   ferrous sulfate (IRON 325) 325 (65 Fe) MG tablet Take 1 tablet by mouth 2 times daily 10/27/22  Yes Serge Simpson DO       Allergies:  Codeine, Monoclonal antibodies, Other, Penicillins, and Prednisone    Social History:   reports that she quit smoking about 22 years ago. Her smoking use included cigarettes. She started smoking about 34 years ago. She has a 12.0 pack-year smoking history. She has never used smokeless tobacco. She reports that she does not drink alcohol and does not use drugs.    Review of Systems:  Constitutional: there has been no unanticipated weight loss. There's been No change in energy level, No

## 2025-01-16 ENCOUNTER — OFFICE VISIT (OUTPATIENT)
Dept: PRIMARY CARE CLINIC | Age: 50
End: 2025-01-16

## 2025-01-16 VITALS
HEART RATE: 88 BPM | OXYGEN SATURATION: 99 % | WEIGHT: 185 LBS | BODY MASS INDEX: 32.77 KG/M2 | DIASTOLIC BLOOD PRESSURE: 74 MMHG | TEMPERATURE: 99.2 F | SYSTOLIC BLOOD PRESSURE: 132 MMHG

## 2025-01-16 DIAGNOSIS — B96.89 BACTERIAL URI: Primary | ICD-10-CM

## 2025-01-16 DIAGNOSIS — J02.9 SORE THROAT: ICD-10-CM

## 2025-01-16 DIAGNOSIS — J06.9 BACTERIAL URI: Primary | ICD-10-CM

## 2025-01-16 LAB
INFLUENZA A ANTIGEN, POC: NEGATIVE
INFLUENZA B ANTIGEN, POC: NEGATIVE
LOT EXPIRE DATE: NORMAL
LOT KIT NUMBER: NORMAL
SARS-COV-2, POC: NORMAL
VALID INTERNAL CONTROL: NORMAL
VENDOR AND KIT NAME POC: NORMAL

## 2025-01-16 RX ORDER — BENZONATATE 100 MG/1
100 CAPSULE ORAL 3 TIMES DAILY PRN
Qty: 30 CAPSULE | Refills: 0 | Status: SHIPPED | OUTPATIENT
Start: 2025-01-16 | End: 2025-01-26

## 2025-01-16 RX ORDER — AZITHROMYCIN 250 MG/1
TABLET, FILM COATED ORAL
Qty: 6 TABLET | Refills: 0 | Status: SHIPPED | OUTPATIENT
Start: 2025-01-16 | End: 2025-01-26

## 2025-01-16 RX ORDER — FLUTICASONE PROPIONATE 50 MCG
2 SPRAY, SUSPENSION (ML) NASAL DAILY
Qty: 16 G | Refills: 0 | Status: SHIPPED | OUTPATIENT
Start: 2025-01-16

## 2025-01-16 ASSESSMENT — PATIENT HEALTH QUESTIONNAIRE - PHQ9
SUM OF ALL RESPONSES TO PHQ QUESTIONS 1-9: 0
1. LITTLE INTEREST OR PLEASURE IN DOING THINGS: NOT AT ALL
SUM OF ALL RESPONSES TO PHQ QUESTIONS 1-9: 0
SUM OF ALL RESPONSES TO PHQ9 QUESTIONS 1 & 2: 0
2. FEELING DOWN, DEPRESSED OR HOPELESS: NOT AT ALL

## 2025-01-16 ASSESSMENT — ENCOUNTER SYMPTOMS
COUGH: 1
SINUS PAIN: 0
SORE THROAT: 1
VOMITING: 0
DIARRHEA: 0
ABDOMINAL PAIN: 0
RHINORRHEA: 0
NAUSEA: 0
SHORTNESS OF BREATH: 0

## 2025-01-16 NOTE — PATIENT INSTRUCTIONS
Complete full course of antibiotics  Benzonatate as needed for cough  Continue with mucinex and nasal sprays  May use a john pot or saline rinses as tolerated  May use tylenol for headaches  Increase water intake  If symptoms worsen follow up with PCP  Patient verbalized understanding and agrees with plan of care

## 2025-01-16 NOTE — PROGRESS NOTES
[facility] Walk In department of [facility]  [address] [facility] [address]  Phone: [phone]  Fax: [phone]      Samantha Serrato  1975  MRN: [id number]  Date of visit: 1/16/2025    Chief Complaint:     Samantha Serrato is here for c/o of Cold Symptoms (Sore throat, cough, nasal congestion  started Monday )      HPI:     Samantha Serrato is a 49 y.o. female who presents to the [facility] today for her medical conditions/complaints as noted below.    Cold Symptoms   This is a new problem. Episode onset: 4 days. The problem has been gradually worsening. There has been no fever. Associated symptoms include congestion, coughing, headaches, a plugged ear sensation and a sore throat. Pertinent negatives include no abdominal pain, chest pain, diarrhea, ear pain, nausea, rhinorrhea, sinus pain or vomiting. Treatments tried: ibuprofen. The treatment provided no relief.   Sick contacts     Past Medical History:   Diagnosis Date    Anxiety     Bilateral pulmonary embolism (HCC)     COVID-19     GERD (gastroesophageal reflux disease)     Goiter     History of COVID-19 10/2021    History of pulmonary embolism 10/2021    Hypokalemia     Hypothyroidism     Tachycardia     chronic, typically 100-110's        Allergies   Allergen Reactions    Codeine Nausea Only    Monoclonal Antibodies Rash     Regeneron    Other      Decongestants- makes heart race    Penicillins      Has never taken. Mom severely allergic    Prednisone Palpitations         Subjective:      Review of Systems   Constitutional:  Positive for chills. Negative for fever.   HENT:  Positive for congestion and sore throat. Negative for ear pain, rhinorrhea and sinus pain.    Respiratory:  Positive for cough. Negative for shortness of breath.    Cardiovascular:  Negative for chest pain.   Gastrointestinal:  Negative for abdominal pain, diarrhea, nausea and vomiting.   Neurological:  Positive for

## 2025-07-17 ENCOUNTER — OFFICE VISIT (OUTPATIENT)
Dept: CARDIOLOGY | Age: 50
End: 2025-07-17
Payer: COMMERCIAL

## 2025-07-17 VITALS
DIASTOLIC BLOOD PRESSURE: 80 MMHG | HEIGHT: 62 IN | WEIGHT: 185 LBS | OXYGEN SATURATION: 98 % | SYSTOLIC BLOOD PRESSURE: 124 MMHG | BODY MASS INDEX: 34.04 KG/M2 | HEART RATE: 107 BPM

## 2025-07-17 DIAGNOSIS — Z87.891 FORMER SMOKER: ICD-10-CM

## 2025-07-17 DIAGNOSIS — R00.2 PALPITATIONS: ICD-10-CM

## 2025-07-17 DIAGNOSIS — Z86.711 HISTORY OF PULMONARY EMBOLUS (PE): ICD-10-CM

## 2025-07-17 DIAGNOSIS — Z09 CARDIOLOGY FOLLOW-UP ENCOUNTER: ICD-10-CM

## 2025-07-17 DIAGNOSIS — R06.02 SHORTNESS OF BREATH: ICD-10-CM

## 2025-07-17 DIAGNOSIS — R00.0 TACHYCARDIA: Primary | ICD-10-CM

## 2025-07-17 PROCEDURE — 93000 ELECTROCARDIOGRAM COMPLETE: CPT | Performed by: STUDENT IN AN ORGANIZED HEALTH CARE EDUCATION/TRAINING PROGRAM

## 2025-07-17 PROCEDURE — 99214 OFFICE O/P EST MOD 30 MIN: CPT | Performed by: STUDENT IN AN ORGANIZED HEALTH CARE EDUCATION/TRAINING PROGRAM

## 2025-07-17 NOTE — PROGRESS NOTES
Samantha Serrato  1975  [id number]    Today: 25    CC:   Chief Complaint   Patient presents with    Tachycardia    Shortness of Breath        HPI:   Samantha Serrato is here for follow-up  She used to follow with cardiology few years ago she had history of pulmonary embolism in the setting of respiratory infection secondary to COVID virus.  She was placed on anticoagulation for period of time and was on metoprolol for tachycardia but she had issues with her insurance coverage and billing from the hospital in the clinic so she quit coming to the clinic.  Currently she is experiencing a lot of palpitations her heart rate today is more than 100 bpm and sinus tachycardia on the EKG.  She denies any symptoms of chest pain syncope or near syncope but this she does have significant shortness of breath.  She also states that her thyroid function has always been abnormal she was supposed to follow with a specialist in [place] but she has not and she does not have a primary care physician yet.    Past Medical History:  Past Medical History:   Diagnosis Date    Anxiety     Bilateral pulmonary embolism (HCC)     COVID-19     GERD (gastroesophageal reflux disease)     Goiter     History of COVID-19 10/2021    History of pulmonary embolism 10/2021    Hypokalemia     Hypothyroidism     Tachycardia     chronic, typically 100-110's         Past Surgical History:  Past Surgical History:   Procedure Laterality Date     SECTION  2005    COLONOSCOPY  2007    with distal ileoscopy and biopsy of the distal ileum and biopsy of the rectum; nonspecific proctitis, otherwise normal colon and normal distal ileum    DENTAL SURGERY      all teeth removed due to dental caries- has dentures         Family History:  Family History   Problem Relation Age of Onset    Other Mother         palpitations; high heart rate    Thyroid Disease Mother     Colon Cancer Mother 57    Emphysema Father     Other Sister

## 2025-07-21 ENCOUNTER — HOSPITAL ENCOUNTER (OUTPATIENT)
Age: 50
Discharge: HOME | End: 2025-07-23
Attending: STUDENT IN AN ORGANIZED HEALTH CARE EDUCATION/TRAINING PROGRAM
Payer: COMMERCIAL

## 2025-07-21 ENCOUNTER — HOSPITAL ENCOUNTER (OUTPATIENT)
Age: 50
Discharge: HOME | End: 2025-07-21
Attending: STUDENT IN AN ORGANIZED HEALTH CARE EDUCATION/TRAINING PROGRAM
Payer: COMMERCIAL

## 2025-07-21 DIAGNOSIS — R00.2 PALPITATIONS: ICD-10-CM

## 2025-07-21 DIAGNOSIS — R00.0 TACHYCARDIA: ICD-10-CM

## 2025-07-21 LAB
BASOPHILS # BLD: 0.06 K/UL (ref 0–0.2)
BASOPHILS NFR BLD: 1 % (ref 0–2)
EOSINOPHIL # BLD: 0.14 K/UL (ref 0–0.44)
EOSINOPHILS RELATIVE PERCENT: 2 % (ref 1–4)
ERYTHROCYTE [DISTWIDTH] IN BLOOD BY AUTOMATED COUNT: 13.6 % (ref 11.8–14.4)
HCT VFR BLD AUTO: 40.3 % (ref 36.3–47.1)
HGB BLD-MCNC: 13 G/DL (ref 11.9–15.1)
IMM GRANULOCYTES # BLD AUTO: <0.03 K/UL (ref 0–0.3)
IMM GRANULOCYTES NFR BLD: 0 %
LYMPHOCYTES NFR BLD: 2.08 K/UL (ref 1.1–3.7)
LYMPHOCYTES RELATIVE PERCENT: 32 % (ref 24–43)
MCH RBC QN AUTO: 28.9 PG (ref 25.2–33.5)
MCHC RBC AUTO-ENTMCNC: 32.3 G/DL (ref 25.2–33.5)
MCV RBC AUTO: 89.6 FL (ref 82.6–102.9)
MONOCYTES NFR BLD: 0.5 K/UL (ref 0.1–1.2)
MONOCYTES NFR BLD: 8 % (ref 3–12)
NEUTROPHILS NFR BLD: 57 % (ref 36–65)
NEUTS SEG NFR BLD: 3.76 K/UL (ref 1.5–8.1)
NRBC BLD-RTO: 0 PER 100 WBC
PLATELET # BLD AUTO: 265 K/UL (ref 138–453)
PMV BLD AUTO: 9.6 FL (ref 8.1–13.5)
RBC # BLD AUTO: 4.5 M/UL (ref 3.95–5.11)
TSH SERPL DL<=0.05 MIU/L-ACNC: 16.3 UIU/ML (ref 0.27–4.2)
WBC OTHER # BLD: 6.6 K/UL (ref 3.5–11.3)

## 2025-07-21 PROCEDURE — 36415 COLL VENOUS BLD VENIPUNCTURE: CPT

## 2025-07-21 PROCEDURE — 85025 COMPLETE CBC W/AUTO DIFF WBC: CPT

## 2025-07-21 PROCEDURE — 93226 XTRNL ECG REC<48 HR SCAN A/R: CPT

## 2025-07-21 PROCEDURE — 84439 ASSAY OF FREE THYROXINE: CPT

## 2025-07-21 PROCEDURE — 84443 ASSAY THYROID STIM HORMONE: CPT

## 2025-07-22 LAB — T4 FREE SERPL-MCNC: 0.6 NG/DL (ref 0.9–1.7)

## 2025-07-25 ENCOUNTER — TELEPHONE (OUTPATIENT)
Dept: CARDIOLOGY | Age: 50
End: 2025-07-25

## 2025-07-25 DIAGNOSIS — R79.89 ELEVATED TSH: Primary | ICD-10-CM

## 2025-07-25 DIAGNOSIS — R00.2 PALPITATIONS: ICD-10-CM

## 2025-07-25 DIAGNOSIS — R00.0 TACHYCARDIA: ICD-10-CM

## 2025-07-25 NOTE — TELEPHONE ENCOUNTER
Pt says she does not have a PCP and does not have a Endocrinologist.  She thinks she never saw one due to the referral went to an out of network doctor.  She knows to check with insurance and see who they want her to see.  Could we put a new External Ref to Endocrine into the system?    Pt is tearful and says she cannot function due to palpitations.  I told her to go to ER and she agrees.  She said she has someone to take her now.    I called [facility] and they will send a list of in-network endocrine doctors.      [facility] says [facility] accept insurance and has Dr. Dulce Mclean for endocrine.  Fax ref and records to [phone].    Pt aware and agrees.

## 2025-07-28 ENCOUNTER — TELEPHONE (OUTPATIENT)
Dept: CARDIOLOGY | Age: 50
End: 2025-07-28

## 2025-07-28 ENCOUNTER — HOSPITAL ENCOUNTER (OUTPATIENT)
Age: 50
Discharge: HOME | End: 2025-07-30
Attending: STUDENT IN AN ORGANIZED HEALTH CARE EDUCATION/TRAINING PROGRAM
Payer: COMMERCIAL

## 2025-07-28 VITALS
WEIGHT: 185 LBS | SYSTOLIC BLOOD PRESSURE: 134 MMHG | BODY MASS INDEX: 34.04 KG/M2 | HEIGHT: 62 IN | DIASTOLIC BLOOD PRESSURE: 73 MMHG

## 2025-07-28 DIAGNOSIS — R06.02 SHORTNESS OF BREATH: ICD-10-CM

## 2025-07-28 LAB
ECHO AO SINUS VALSALVA DIAM: 2.7 CM
ECHO AO SINUS VALSALVA INDEX: 1.46 CM/M2
ECHO AV AREA PEAK VELOCITY: 2.7 CM2
ECHO AV AREA VTI: 2.8 CM2
ECHO AV AREA/BSA PEAK VELOCITY: 1.5 CM2/M2
ECHO AV AREA/BSA VTI: 1.5 CM2/M2
ECHO AV MEAN GRADIENT: 4 MMHG
ECHO AV MEAN VELOCITY: 1 M/S
ECHO AV PEAK GRADIENT: 8 MMHG
ECHO AV PEAK VELOCITY: 1.4 M/S
ECHO AV VELOCITY RATIO: 0.86
ECHO AV VTI: 26.5 CM
ECHO BSA: 1.92 M2
ECHO EST RA PRESSURE: 3 MMHG
ECHO LA AREA 2C: 16.4 CM2
ECHO LA AREA 4C: 15.8 CM2
ECHO LA DIAMETER INDEX: 1.68 CM/M2
ECHO LA DIAMETER: 3.1 CM
ECHO LA MAJOR AXIS: 5.6 CM
ECHO LA MINOR AXIS: 5.1 CM
ECHO LA VOL BP: 41 ML (ref 22–52)
ECHO LA VOL MOD A2C: 43 ML (ref 22–52)
ECHO LA VOL MOD A4C: 37 ML (ref 22–52)
ECHO LA VOL/BSA BIPLANE: 22 ML/M2 (ref 16–34)
ECHO LA VOLUME INDEX MOD A2C: 23 ML/M2 (ref 16–34)
ECHO LA VOLUME INDEX MOD A4C: 20 ML/M2 (ref 16–34)
ECHO LV E' LATERAL VELOCITY: 9.46 CM/S
ECHO LV E' SEPTAL VELOCITY: 8.05 CM/S
ECHO LV EDV A4C: 76 ML
ECHO LV EDV INDEX A4C: 41 ML/M2
ECHO LV EF PHYSICIAN: 55 %
ECHO LV EJECTION FRACTION A4C: 57 %
ECHO LV EJECTION FRACTION BIPLANE: 57 % (ref 55–100)
ECHO LV ESV A4C: 33 ML
ECHO LV ESV INDEX A4C: 18 ML/M2
ECHO LV FRACTIONAL SHORTENING: 28 % (ref 28–44)
ECHO LV INTERNAL DIMENSION DIASTOLE INDEX: 2.54 CM/M2
ECHO LV INTERNAL DIMENSION DIASTOLIC: 4.7 CM (ref 3.9–5.3)
ECHO LV INTERNAL DIMENSION SYSTOLIC INDEX: 1.84 CM/M2
ECHO LV INTERNAL DIMENSION SYSTOLIC: 3.4 CM
ECHO LV IVSD: 0.8 CM (ref 0.6–0.9)
ECHO LV MASS 2D: 132.3 G (ref 67–162)
ECHO LV MASS INDEX 2D: 71.5 G/M2 (ref 43–95)
ECHO LV POSTERIOR WALL DIASTOLIC: 0.9 CM (ref 0.6–0.9)
ECHO LV RELATIVE WALL THICKNESS RATIO: 0.38
ECHO LVOT AREA: 3.1 CM2
ECHO LVOT AV VTI INDEX: 0.88
ECHO LVOT DIAM: 2 CM
ECHO LVOT MEAN GRADIENT: 3 MMHG
ECHO LVOT PEAK GRADIENT: 6 MMHG
ECHO LVOT PEAK VELOCITY: 1.2 M/S
ECHO LVOT STROKE VOLUME INDEX: 39.4 ML/M2
ECHO LVOT SV: 72.8 ML
ECHO LVOT VTI: 23.2 CM
ECHO MV A VELOCITY: 0.9 M/S
ECHO MV AREA VTI: 3.5 CM2
ECHO MV LVOT VTI INDEX: 0.89
ECHO MV MAX VELOCITY: 1 M/S
ECHO MV MEAN GRADIENT: 2 MMHG
ECHO MV MEAN VELOCITY: 0.7 M/S
ECHO MV PEAK GRADIENT: 4 MMHG
ECHO MV VTI: 20.7 CM
ECHO PV MAX VELOCITY: 0.9 M/S
ECHO PV PEAK GRADIENT: 4 MMHG
ECHO RA AREA 4C: 9.5 CM2
ECHO RA END SYSTOLIC VOLUME APICAL 4 CHAMBER INDEX BSA: 10 ML/M2
ECHO RA VOLUME: 19 ML
ECHO RIGHT VENTRICULAR SYSTOLIC PRESSURE (RVSP): 19 MMHG
ECHO RV FREE WALL PEAK S': 13.8 CM/S
ECHO RV INTERNAL DIMENSION: 2.9 CM
ECHO RV TAPSE: 2.3 CM (ref 1.7–?)
ECHO TV E WAVE: 0.7 M/S
ECHO TV REGURGITANT MAX VELOCITY: 1.98 M/S
ECHO TV REGURGITANT PEAK GRADIENT: 17 MMHG

## 2025-07-28 PROCEDURE — 93306 TTE W/DOPPLER COMPLETE: CPT | Performed by: INTERNAL MEDICINE

## 2025-07-28 PROCEDURE — 93306 TTE W/DOPPLER COMPLETE: CPT

## 2025-07-28 RX ORDER — METOPROLOL SUCCINATE 25 MG/1
25 TABLET, EXTENDED RELEASE ORAL DAILY
Qty: 90 TABLET | Refills: 1 | Status: SHIPPED | OUTPATIENT
Start: 2025-07-28

## 2025-07-28 NOTE — TELEPHONE ENCOUNTER
Pt would like her metoprolol back due to palpitations.  Please send to [facility] in [place] if agreeable.    Pt aware of echo results.  Confirms January follow up, unless Dr. Carroll needs to see her sooner due to medicine request.      She is planning to see Endocrine at [place] for elevated TSH.  I transferred her to [place] to see if she can get scheduled.

## 2025-07-31 ENCOUNTER — TELEPHONE (OUTPATIENT)
Dept: CARDIOLOGY | Age: 50
End: 2025-07-31

## 2025-07-31 NOTE — TELEPHONE ENCOUNTER
Interpretation Summary    Sinus-average heart rate 95.  Low heart rate 73.  Sinus tachycardia to 137.  Rare PACs and PVCs  Some bigeminy

## 2025-08-01 NOTE — TELEPHONE ENCOUNTER
Pt notified of results and recommendations per Dr Marcus. Pt verbalized understanding and stated she is scheduled to see Dulce Mclean on 8/7 with [facility]. Pt had no further questions or concerns at the time.